# Patient Record
Sex: MALE | Race: WHITE | NOT HISPANIC OR LATINO | Employment: FULL TIME | URBAN - METROPOLITAN AREA
[De-identification: names, ages, dates, MRNs, and addresses within clinical notes are randomized per-mention and may not be internally consistent; named-entity substitution may affect disease eponyms.]

---

## 2019-11-14 ENCOUNTER — TELEPHONE (OUTPATIENT)
Dept: OBGYN CLINIC | Facility: HOSPITAL | Age: 54
End: 2019-11-14

## 2019-11-14 NOTE — TELEPHONE ENCOUNTER
Patient will be calling back to provide claim information        Insurance:  [employer]  Billing Address:  Claim #:  DOI:  /contact name:  Phone:  Fax:  Employer

## 2019-11-14 NOTE — TELEPHONE ENCOUNTER
Patient has called back to provide [facility] information    Patient was not able to provide   Address or Fax Number    [facility] information that was provided:    DOI: 10/22/2019    [facility] [address]att # [id number]    Adjusters Name: Aly Ellia Number: [phone]    QSRCV [id number]

## 2019-11-15 ENCOUNTER — OFFICE VISIT (OUTPATIENT)
Dept: OBGYN CLINIC | Facility: CLINIC | Age: 54
End: 2019-11-15
Payer: OTHER MISCELLANEOUS

## 2019-11-15 VITALS
WEIGHT: 175 LBS | HEART RATE: 102 BPM | DIASTOLIC BLOOD PRESSURE: 76 MMHG | HEIGHT: 66 IN | BODY MASS INDEX: 28.12 KG/M2 | SYSTOLIC BLOOD PRESSURE: 133 MMHG

## 2019-11-15 DIAGNOSIS — M70.62 GREATER TROCHANTERIC BURSITIS OF LEFT HIP: ICD-10-CM

## 2019-11-15 DIAGNOSIS — M76.12 PSOAS TENDINITIS OF LEFT SIDE: Primary | ICD-10-CM

## 2019-11-15 DIAGNOSIS — M25.552 PAIN IN LEFT HIP: ICD-10-CM

## 2019-11-15 PROCEDURE — 99203 OFFICE O/P NEW LOW 30 MIN: CPT | Performed by: ORTHOPAEDIC SURGERY

## 2019-11-15 RX ORDER — AMLODIPINE BESYLATE 5 MG/1
5 TABLET ORAL DAILY
COMMUNITY
Start: 2019-11-07

## 2019-11-15 NOTE — PROGRESS NOTES
52 y o male presents for evaluation of work related injury to his left hip region  Injury date was 10/22/2019 after lifting 2 boxes weighing approximately 50 lb off align he developed pain in his anterior groin and some of the posterior lateral aspect of the left hip  Patient notes that he had left total hip arthroplasty done August 2016 by Dr Colt Chapa at [facility]  He was doing well postoperatively without any problems until this event at work  Patient denies any specific back pain or radiation of pain down his leg  Denies any gross numbness or tingling in his lower extremities  He has use ice and heat and Tylenol with some improvement  He notes that ambulation and being on his feet a lot seems to aggravate his symptoms  His symptoms are significantly decreased when he is relaxed or at rest   He currently has a pain rating of 5/10  He has not done any recent physical therapy  He has not seen any other providers for this  Review of Systems  Review of systems negative unless otherwise specified in HPI    Past Medical History  Past Medical History:   Diagnosis Date    Hypertension      Past Surgical History  Past Surgical History:   Procedure Laterality Date    HIP SURGERY       Current Medications  Current Outpatient Medications on File Prior to Visit   Medication Sig Dispense Refill    amLODIPine (NORVASC) 5 mg tablet Take 5 mg by mouth daily       No current facility-administered medications on file prior to visit  Recent Labs (HCT,HGB,PT,INR,ESR,CRP,GLU,HgA1C)  No results found for: HCT, HGB, WBC, PT, INR, ESR, CRP, GLUCOSE, HGBA1C    Physical exam  · General: Awake, Alert, Oriented  · Eyes: Pupils equal, round and reactive to light  · Heart: regular rate and rhythm  · Lungs: No audible wheezing  · Abdomen: soft  left Lower extremity  · Well-healed anterior incision without erythema or warmth  No gross effusion  Leg lengths grossly symmetric  No calf pain negative Homans sign    Thigh soft nontender  Seated position or range of motion external rotation 45° internal rotation 20° quad and hamstring strength 4+ and symmetric bilaterally  Deep tendon reflexes that the patella 1+/4 and symmetric bilaterally  Light touch sensation intact throughout the thigh in shin and symmetric bilaterally  There is some discomfort with palpation over the left greater trochanteric region  There is no pain with palpation throughout the midline spinous processes or the SI joints  Dorsalis pedis posterior tibial pulses 3+/4  Procedure  None    Imaging  Reviewed outside films which notes left hip arthroplasty with some heterotopic bone formation but no gross signs of loosening  1  Psoas tendinitis of left side    2  Greater trochanteric bursitis of left hip    3  Pain in left hip      Assessment:  left total hip arthroplasty with some greater troch bursitis and psoas tendinitis  Plan:  Case was reviewed with Dr Renetta Sofia  We will get the patient started in physical therapy  Patient is to continue light duty as current  Patient will need follow up with his operative surgeon at [facility] Dr Alin Alvarez for further definitive care and treatment  Patient was to have a follow up in summer this will need to be rescheduled to sooner with his [address] [place] [address]

## 2019-11-15 NOTE — PATIENT INSTRUCTIONS
Case was reviewed with Dr Elvie France  We will get the patient started in physical therapy  Patient is to continue light duty as current  Patient will need follow up with his operative surgeon at [facility] Dr Isidro Angeles for further definitive care and treatment  Patient was to have a follow up in summer this will need to be rescheduled to sooner with his [address] [place] [address] Road

## 2019-11-15 NOTE — LETTER
November 15, 2019     Patient: David Monroe   YOB: 1965   Date of Visit: 11/15/2019       To Whom It May Concern: It is my medical opinion that David Monroe may return to light duty immediately with the following restrictions: Continues current light duty position with restriction until seen by his operative surgeon at [facility]  If you have any questions or concerns, please don't hesitate to call           Sincerely,        Genevieve Sepulveda PA-C  CC: No Recipients

## 2021-03-04 DIAGNOSIS — R06.00 DYSPNEA, UNSPECIFIED: ICD-10-CM

## 2021-03-04 DIAGNOSIS — Q25.1 COARCTATION OF AORTA: ICD-10-CM

## 2021-03-04 DIAGNOSIS — I47.1 SUPRAVENTRICULAR TACHYCARDIA (HCC): ICD-10-CM

## 2021-03-04 DIAGNOSIS — I49.3 VENTRICULAR PREMATURE DEPOLARIZATION: ICD-10-CM

## 2021-03-04 DIAGNOSIS — Z01.810 ENCOUNTER FOR PREPROCEDURAL CARDIOVASCULAR EXAMINATION: ICD-10-CM

## 2021-03-04 DIAGNOSIS — I44.4 LEFT ANTERIOR FASCICULAR BLOCK: ICD-10-CM

## 2021-03-04 DIAGNOSIS — I49.1 ATRIAL PREMATURE DEPOLARIZATION: ICD-10-CM

## 2021-03-10 ENCOUNTER — HOSPITAL ENCOUNTER (OUTPATIENT)
Dept: CT IMAGING | Facility: HOSPITAL | Age: 56
Discharge: HOME | End: 2021-03-10
Attending: INTERNAL MEDICINE
Payer: COMMERCIAL

## 2021-03-10 DIAGNOSIS — Q25.1 COARCTATION OF AORTA: ICD-10-CM

## 2021-03-10 DIAGNOSIS — I47.1 SUPRAVENTRICULAR TACHYCARDIA (HCC): ICD-10-CM

## 2021-03-10 DIAGNOSIS — I49.3 VENTRICULAR PREMATURE DEPOLARIZATION: ICD-10-CM

## 2021-03-10 DIAGNOSIS — I49.1 ATRIAL PREMATURE DEPOLARIZATION: ICD-10-CM

## 2021-03-10 DIAGNOSIS — Z01.810 ENCOUNTER FOR PREPROCEDURAL CARDIOVASCULAR EXAMINATION: ICD-10-CM

## 2021-03-10 DIAGNOSIS — I44.4 LEFT ANTERIOR FASCICULAR BLOCK: ICD-10-CM

## 2021-03-10 DIAGNOSIS — R06.00 DYSPNEA, UNSPECIFIED: ICD-10-CM

## 2021-03-10 PROCEDURE — 71275 CT ANGIOGRAPHY CHEST: CPT

## 2021-03-10 RX ADMIN — IOHEXOL 85 ML: 350 INJECTION, SOLUTION INTRAVENOUS at 07:59

## 2021-03-23 ENCOUNTER — HOSPITAL ENCOUNTER (OUTPATIENT)
Dept: NON INVASIVE DIAGNOSTICS | Facility: HOSPITAL | Age: 56
Discharge: HOME | End: 2021-03-23
Attending: INTERNAL MEDICINE
Payer: COMMERCIAL

## 2021-03-23 ENCOUNTER — HOSPITAL ENCOUNTER (OUTPATIENT)
Dept: NUCLEAR MEDICINE | Facility: HOSPITAL | Age: 56
Discharge: HOME | End: 2021-03-23
Attending: INTERNAL MEDICINE
Payer: COMMERCIAL

## 2021-03-23 DIAGNOSIS — R06.00 DYSPNEA, UNSPECIFIED TYPE: ICD-10-CM

## 2021-03-23 DIAGNOSIS — I49.1 ATRIAL PREMATURE DEPOLARIZATION: ICD-10-CM

## 2021-03-23 DIAGNOSIS — I44.4 LEFT ANTERIOR FASCICULAR BLOCK: ICD-10-CM

## 2021-03-23 DIAGNOSIS — R06.00 DYSPNEA, UNSPECIFIED: ICD-10-CM

## 2021-03-23 DIAGNOSIS — Z01.810 ENCOUNTER FOR PREPROCEDURAL CARDIOVASCULAR EXAMINATION: ICD-10-CM

## 2021-03-23 DIAGNOSIS — I49.3 VENTRICULAR PREMATURE DEPOLARIZATION: ICD-10-CM

## 2021-03-23 DIAGNOSIS — I47.1 SUPRAVENTRICULAR TACHYCARDIA (HCC): ICD-10-CM

## 2021-03-23 DIAGNOSIS — Q25.1 COARCTATION OF AORTA: ICD-10-CM

## 2021-03-23 PROCEDURE — A9502 TC99M TETROFOSMIN: HCPCS

## 2021-03-23 PROCEDURE — 78452 HT MUSCLE IMAGE SPECT MULT: CPT

## 2021-03-23 PROCEDURE — 93017 CV STRESS TEST TRACING ONLY: CPT

## 2021-03-23 PROCEDURE — 93306 TTE W/DOPPLER COMPLETE: CPT

## 2021-03-23 RX ORDER — LISINOPRIL 5 MG/1
TABLET ORAL
COMMUNITY
Start: 2021-03-08

## 2021-03-23 RX ORDER — TAMSULOSIN HYDROCHLORIDE 0.4 MG/1
CAPSULE ORAL
COMMUNITY
Start: 2021-02-10

## 2021-03-23 RX ADMIN — PERFLUTREN 0.4 ML/MIN: 6.52 INJECTION, SUSPENSION INTRAVENOUS at 09:57

## 2021-03-23 RX ADMIN — REGADENOSON 0.4 MG: 0.08 INJECTION, SOLUTION INTRAVENOUS at 11:25

## 2021-03-29 LAB
ARRHY DURING EX: NORMAL
CHEST PAIN STATEMENT: NORMAL
MAX DIASTOLIC BP: 66 MMHG
MAX HEART RATE: 123 BPM
MAX PREDICTED HEART RATE: 163 BPM
MAX. SYSTOLIC BP: 122 MMHG
PROTOCOL NAME: NORMAL
REASON FOR TERMINATION: NORMAL
TARGET HR FORMULA: NORMAL
TEST INDICATION: NORMAL
TIME IN EXERCISE PHASE: NORMAL

## 2021-12-16 ENCOUNTER — OFFICE VISIT (OUTPATIENT)
Dept: OBGYN CLINIC | Facility: CLINIC | Age: 56
End: 2021-12-16
Payer: COMMERCIAL

## 2021-12-16 VITALS
DIASTOLIC BLOOD PRESSURE: 80 MMHG | WEIGHT: 161 LBS | HEART RATE: 82 BPM | HEIGHT: 66 IN | BODY MASS INDEX: 25.88 KG/M2 | SYSTOLIC BLOOD PRESSURE: 136 MMHG | TEMPERATURE: 97.2 F

## 2021-12-16 DIAGNOSIS — M17.11 PRIMARY OSTEOARTHRITIS OF RIGHT KNEE: ICD-10-CM

## 2021-12-16 DIAGNOSIS — M25.561 CHRONIC PAIN OF RIGHT KNEE: Primary | ICD-10-CM

## 2021-12-16 DIAGNOSIS — G89.29 CHRONIC PAIN OF RIGHT KNEE: Primary | ICD-10-CM

## 2021-12-16 PROCEDURE — 99203 OFFICE O/P NEW LOW 30 MIN: CPT | Performed by: STUDENT IN AN ORGANIZED HEALTH CARE EDUCATION/TRAINING PROGRAM

## 2021-12-30 ENCOUNTER — TELEPHONE (OUTPATIENT)
Dept: OBGYN CLINIC | Facility: HOSPITAL | Age: 56
End: 2021-12-30

## 2022-01-10 ENCOUNTER — TELEPHONE (OUTPATIENT)
Dept: OBGYN CLINIC | Facility: HOSPITAL | Age: 57
End: 2022-01-10

## 2022-01-10 NOTE — TELEPHONE ENCOUNTER
When the patient calls back - please advise his 1st Visco appt was scheduled for today in error  I changed it accordingly  He now has his 1st on 1/17  2nd and 3rd each week afterwards  Please review his schedule with him

## 2022-01-14 ENCOUNTER — TELEPHONE (OUTPATIENT)
Dept: OBGYN CLINIC | Facility: CLINIC | Age: 57
End: 2022-01-14

## 2022-02-03 ENCOUNTER — PROCEDURE VISIT (OUTPATIENT)
Dept: OBGYN CLINIC | Facility: CLINIC | Age: 57
End: 2022-02-03
Payer: COMMERCIAL

## 2022-02-03 VITALS
DIASTOLIC BLOOD PRESSURE: 58 MMHG | HEIGHT: 66 IN | WEIGHT: 161 LBS | OXYGEN SATURATION: 98 % | HEART RATE: 84 BPM | SYSTOLIC BLOOD PRESSURE: 100 MMHG | BODY MASS INDEX: 25.88 KG/M2 | TEMPERATURE: 97.8 F

## 2022-02-03 DIAGNOSIS — M25.561 CHRONIC PAIN OF RIGHT KNEE: Primary | ICD-10-CM

## 2022-02-03 DIAGNOSIS — M17.11 PRIMARY OSTEOARTHRITIS OF RIGHT KNEE: ICD-10-CM

## 2022-02-03 DIAGNOSIS — G89.29 CHRONIC PAIN OF RIGHT KNEE: Primary | ICD-10-CM

## 2022-02-03 PROCEDURE — 20610 DRAIN/INJ JOINT/BURSA W/O US: CPT | Performed by: STUDENT IN AN ORGANIZED HEALTH CARE EDUCATION/TRAINING PROGRAM

## 2022-02-03 RX ORDER — HYALURONATE SODIUM 10 MG/ML
20 SYRINGE (ML) INTRAARTICULAR
Status: COMPLETED | OUTPATIENT
Start: 2022-02-03 | End: 2022-02-03

## 2022-02-03 RX ORDER — CHLORTHALIDONE 25 MG/1
TABLET ORAL
COMMUNITY
Start: 2021-12-19

## 2022-02-03 RX ORDER — TADALAFIL 20 MG/1
20 TABLET ORAL
COMMUNITY
Start: 2022-01-06 | End: 2023-01-06

## 2022-02-03 RX ADMIN — Medication 20 MG: at 09:42

## 2022-02-03 NOTE — PROGRESS NOTES
Large joint arthrocentesis: R knee  Universal Protocol:  Consent: Verbal consent obtained  Risks and benefits: risks, benefits and alternatives were discussed  Consent given by: patient  Time out: Immediately prior to procedure a "time out" was called to verify the correct patient, procedure, equipment, support staff and site/side marked as required  Timeout called at: 2/3/2022 9:36 AM   Patient understanding: patient states understanding of the procedure being performed  Site marked: the operative site was marked  Radiology Images displayed and confirmed   If images not available, report reviewed: imaging studies available  Patient identity confirmed: verbally with patient    Supporting Documentation  Indications: pain   Procedure Details  Location: knee - R knee  Preparation: Patient was prepped and draped in the usual sterile fashion  Needle size: 22 G  Ultrasound guidance: no  Approach: anterolateral  Medications administered: 20 mg Sodium Hyaluronate 20 MG/2ML    Patient tolerance: patient tolerated the procedure well with no immediate complications  Dressing:  Sterile dressing applied    Euflexxa #1/3 - follow up 1 week for #2/3

## 2022-02-10 ENCOUNTER — PROCEDURE VISIT (OUTPATIENT)
Dept: OBGYN CLINIC | Facility: CLINIC | Age: 57
End: 2022-02-10
Payer: COMMERCIAL

## 2022-02-10 VITALS
TEMPERATURE: 95.7 F | HEART RATE: 94 BPM | WEIGHT: 161 LBS | DIASTOLIC BLOOD PRESSURE: 68 MMHG | HEIGHT: 66 IN | SYSTOLIC BLOOD PRESSURE: 108 MMHG | BODY MASS INDEX: 25.88 KG/M2

## 2022-02-10 DIAGNOSIS — G89.29 CHRONIC PAIN OF RIGHT KNEE: Primary | ICD-10-CM

## 2022-02-10 DIAGNOSIS — M25.561 CHRONIC PAIN OF RIGHT KNEE: Primary | ICD-10-CM

## 2022-02-10 DIAGNOSIS — M17.11 PRIMARY OSTEOARTHRITIS OF RIGHT KNEE: ICD-10-CM

## 2022-02-10 PROCEDURE — 20610 DRAIN/INJ JOINT/BURSA W/O US: CPT | Performed by: STUDENT IN AN ORGANIZED HEALTH CARE EDUCATION/TRAINING PROGRAM

## 2022-02-10 RX ORDER — HYALURONATE SODIUM 10 MG/ML
20 SYRINGE (ML) INTRAARTICULAR
Status: COMPLETED | OUTPATIENT
Start: 2022-02-10 | End: 2022-02-10

## 2022-02-10 RX ADMIN — Medication 20 MG: at 09:48

## 2022-02-10 NOTE — PATIENT INSTRUCTIONS
Hyaluronic Acid (By injection)   Hyaluronic Acid (acr-dl-xll-ON-ate AS-id)  Treats severe pain in your knee due to osteoarthritis  Brand Name(s): Durolane, Euflexxa, Gel-One, GelSyn-3, GenVisc 850, Hyalgan, Hymovis, Monovisc, Orthovisc, Supartz FX, TriLURON, TriVisc, Visco-3   There may be other brand names for this medicine  When This Medicine Should Not Be Used: This medicine is not right for everyone  You should not receive it if you had an allergic reaction to hyaluronic acid or if you have a bleeding disorder  How to Use This Medicine:   Injectable  · Your doctor will tell you how many injections you will need  This medicine is injected into your knee joint  · A nurse or other health provider will give you this medicine  Drugs and Foods to Avoid:      Ask your doctor or pharmacist before using any other medicine, including over-the-counter medicines, vitamins, and herbal products  Warnings While Using This Medicine:   · Tell your doctor if you are pregnant or breastfeeding, or if you have any allergies, including to birds, feathers, or eggs  · Rest your knee for 48 hours after you receive an injection  Do not do strenuous, weightbearing activities, such as jogging or tennis  Avoid activities that keep you standing for longer than 1 hour  Possible Side Effects While Using This Medicine:   Call your doctor right away if you notice any of these side effects:  · Allergic reaction: Itching or hives, swelling in your face or hands, swelling or tingling in your mouth or throat, chest tightness, trouble breathing  If you notice these less serious side effects, talk with your doctor:   · Mild increase in pain or swelling in your knee  · Pain, redness, or swelling where the medicine is injected  If you notice other side effects that you think are caused by this medicine, tell your doctor  Call your doctor for medical advice about side effects   You may report side effects to FDA at 0-341-FDA-9458    © 1416 [facility] 2021 Information is for End User's use only and may not be sold, redistributed or otherwise used for commercial purposes  The above information is an  only  It is not intended as medical advice for individual conditions or treatments  Talk to your doctor, nurse or pharmacist before following any medical regimen to see if it is safe and effective for you

## 2022-02-10 NOTE — PROGRESS NOTES
Large joint arthrocentesis: R knee  Universal Protocol:  Consent: Verbal consent obtained  Risks and benefits: risks, benefits and alternatives were discussed  Consent given by: patient  Time out: Immediately prior to procedure a "time out" was called to verify the correct patient, procedure, equipment, support staff and site/side marked as required  Timeout called at: 2/10/2022 9:46 AM   Patient understanding: patient states understanding of the procedure being performed  Site marked: the operative site was marked  Radiology Images displayed and confirmed   If images not available, report reviewed: imaging studies available  Patient identity confirmed: verbally with patient    Supporting Documentation  Indications: pain   Procedure Details  Location: knee - R knee  Preparation: Patient was prepped and draped in the usual sterile fashion  Needle size: 22 G  Ultrasound guidance: no  Approach: anterolateral  Medications administered: 20 mg Sodium Hyaluronate 20 MG/2ML    Patient tolerance: patient tolerated the procedure well with no immediate complications  Dressing:  Sterile dressing applied

## 2022-02-17 ENCOUNTER — PROCEDURE VISIT (OUTPATIENT)
Dept: OBGYN CLINIC | Facility: CLINIC | Age: 57
End: 2022-02-17
Payer: COMMERCIAL

## 2022-02-17 ENCOUNTER — TELEPHONE (OUTPATIENT)
Dept: OBGYN CLINIC | Facility: CLINIC | Age: 57
End: 2022-02-17

## 2022-02-17 VITALS
HEIGHT: 66 IN | TEMPERATURE: 98.4 F | DIASTOLIC BLOOD PRESSURE: 70 MMHG | WEIGHT: 161 LBS | OXYGEN SATURATION: 97 % | HEART RATE: 79 BPM | BODY MASS INDEX: 25.88 KG/M2 | SYSTOLIC BLOOD PRESSURE: 110 MMHG

## 2022-02-17 DIAGNOSIS — M25.561 CHRONIC PAIN OF RIGHT KNEE: ICD-10-CM

## 2022-02-17 DIAGNOSIS — M17.11 PRIMARY OSTEOARTHRITIS OF RIGHT KNEE: ICD-10-CM

## 2022-02-17 DIAGNOSIS — G89.29 CHRONIC PAIN OF RIGHT KNEE: ICD-10-CM

## 2022-02-17 PROCEDURE — 20610 DRAIN/INJ JOINT/BURSA W/O US: CPT | Performed by: STUDENT IN AN ORGANIZED HEALTH CARE EDUCATION/TRAINING PROGRAM

## 2022-02-17 RX ORDER — HYALURONATE SODIUM 10 MG/ML
20 SYRINGE (ML) INTRAARTICULAR
Status: COMPLETED | OUTPATIENT
Start: 2022-02-17 | End: 2022-02-17

## 2022-02-17 RX ADMIN — Medication 20 MG: at 11:36

## 2022-02-17 NOTE — TELEPHONE ENCOUNTER
Attempted to reach pharmacy regarding [facility] Cornea not being able to  his previous order of Voltaren, Left message to confirm they had received the current order, was unable to reach pharmacy staff at the moment

## 2022-02-17 NOTE — PROGRESS NOTES
Large joint arthrocentesis: R knee  Universal Protocol:  Consent: Verbal consent obtained  Risks and benefits: risks, benefits and alternatives were discussed  Consent given by: patient  Time out: Immediately prior to procedure a "time out" was called to verify the correct patient, procedure, equipment, support staff and site/side marked as required  Timeout called at: 2/17/2022 10:25 AM   Patient understanding: patient states understanding of the procedure being performed  Site marked: the operative site was marked  Radiology Images displayed and confirmed   If images not available, report reviewed: imaging studies available  Patient identity confirmed: verbally with patient    Supporting Documentation  Indications: pain   Procedure Details  Location: knee - R knee  Preparation: Patient was prepped and draped in the usual sterile fashion  Needle size: 22 G  Ultrasound guidance: no  Approach: anterolateral  Medications administered: 20 mg Sodium Hyaluronate 20 MG/2ML    Patient tolerance: patient tolerated the procedure well with no immediate complications  Dressing:  Sterile dressing applied    Euflexxa #3/3

## 2022-02-17 NOTE — PATIENT INSTRUCTIONS
Hyaluronic Acid (By injection)   Hyaluronic Acid (fis-kv-lwk-ON-ate AS-id)  Treats severe pain in your knee due to osteoarthritis  Brand Name(s): Durolane, Euflexxa, Gel-One, GelSyn-3, GenVisc 850, Hyalgan, Hymovis, Monovisc, Orthovisc, Supartz FX, TriLURON, TriVisc, Visco-3   There may be other brand names for this medicine  When This Medicine Should Not Be Used: This medicine is not right for everyone  You should not receive it if you had an allergic reaction to hyaluronic acid or if you have a bleeding disorder  How to Use This Medicine:   Injectable  · Your doctor will tell you how many injections you will need  This medicine is injected into your knee joint  · A nurse or other health provider will give you this medicine  Drugs and Foods to Avoid:      Ask your doctor or pharmacist before using any other medicine, including over-the-counter medicines, vitamins, and herbal products  Warnings While Using This Medicine:   · Tell your doctor if you are pregnant or breastfeeding, or if you have any allergies, including to birds, feathers, or eggs  · Rest your knee for 48 hours after you receive an injection  Do not do strenuous, weightbearing activities, such as jogging or tennis  Avoid activities that keep you standing for longer than 1 hour  Possible Side Effects While Using This Medicine:   Call your doctor right away if you notice any of these side effects:  · Allergic reaction: Itching or hives, swelling in your face or hands, swelling or tingling in your mouth or throat, chest tightness, trouble breathing  If you notice these less serious side effects, talk with your doctor:   · Mild increase in pain or swelling in your knee  · Pain, redness, or swelling where the medicine is injected  If you notice other side effects that you think are caused by this medicine, tell your doctor  Call your doctor for medical advice about side effects   You may report side effects to FDA at 8-934-FDA-4630    © 5489 [facility] 2021 Information is for End User's use only and may not be sold, redistributed or otherwise used for commercial purposes  The above information is an  only  It is not intended as medical advice for individual conditions or treatments  Talk to your doctor, nurse or pharmacist before following any medical regimen to see if it is safe and effective for you

## 2022-03-21 ENCOUNTER — OFFICE VISIT (OUTPATIENT)
Dept: OBGYN CLINIC | Facility: CLINIC | Age: 57
End: 2022-03-21
Payer: COMMERCIAL

## 2022-03-21 VITALS
BODY MASS INDEX: 25.88 KG/M2 | HEART RATE: 91 BPM | HEIGHT: 66 IN | TEMPERATURE: 96.1 F | WEIGHT: 161 LBS | DIASTOLIC BLOOD PRESSURE: 76 MMHG | SYSTOLIC BLOOD PRESSURE: 110 MMHG

## 2022-03-21 DIAGNOSIS — M17.11 PRIMARY OSTEOARTHRITIS OF RIGHT KNEE: ICD-10-CM

## 2022-03-21 DIAGNOSIS — G89.29 CHRONIC PAIN OF RIGHT KNEE: Primary | ICD-10-CM

## 2022-03-21 DIAGNOSIS — M25.561 CHRONIC PAIN OF RIGHT KNEE: Primary | ICD-10-CM

## 2022-03-21 PROCEDURE — 99212 OFFICE O/P EST SF 10 MIN: CPT | Performed by: STUDENT IN AN ORGANIZED HEALTH CARE EDUCATION/TRAINING PROGRAM

## 2022-03-21 NOTE — PROGRESS NOTES
1  Chronic pain of right knee     2  Primary osteoarthritis of right knee       No orders of the defined types were placed in this encounter  Imaging Studies (I personally reviewed images in PACS and report):     X-ray right knee 12/02/2020: vis [facility] (I am unable to personally review images today, but report is available): X-ray 3 views of the right knee read from orthopedic standpoint show severe osteoarthritis of the right knee in the medial compartment with varus deformity there is also moderate patellofemoral arthritis  IMPRESSION:   Chronic right knee pain secondary to primary osteoarthritis with reported severe degenerative changes - improved status post Euflexxa injections    Other factors:  Noted to have Euflexxa x3 injections in December, 2020 from [facility] - reportedly had 6 months of relief with this injection    PLAN:     Clinical exam and radiographic imaging reviewed with patient today, with impression as per above  I have discussed with the patient the pathophysiology of this diagnosis and reviewed how the examination correlates with this diagnosis   Reassured patient that he is appropriately improving since his viscosupplementation injection as he has about 6 months relief with these injections, I will follow-up with him in 5 months to re-evaluate his symptoms to determine if he would want a repeat injection   In the interim, I recommend he continue using his knee bracing given the severity is osteoarthritis and his gait dysfunction due to his degenerative changes   I did discuss with patient that given the severe his osteoarthritis, he could be indicated for knee replacement but he prefers to defer surgical options for as long as possible with viscosupplementation injections for now  Return in about 5 months (around 8/21/2022) for Plan to inject right knee with visco shots in 5 months           CHIEF COMPLAINT:  Chief Complaint   Patient presents with    Follow-up HPI:  Freeman Saunders is a 60 y o  male  who presents for     Visit 03/21/2022: Follow up right knee pain s/p visco injection:  Patient is proximal 1 month since Euflexxa injections of his right knee  He states he has noticed significant relief since these injections about 3 weeks after receiving his 3rd injection  He states he is not 100% pain free, but feels that the pain is mild/tolerable and he is able to accomplish ADLs and sleep without issue  He states when he does have pain is over the superior aspect of his patella as well as the medial joint line  He states he continues to use his knee brace which does provide him support while he is working  He reports intermittent clicking/popping his knee and swelling towards the end of the day  He is satisfied with his progress thus far and does not feel that he would want to pursue surgical intervention for this issue yet  Denies new injuries since last visit  Denies any numbness of his right lower extremity        Medical, Surgical, Family, and Social History    Past Medical History:   Diagnosis Date    Hypertension      Past Surgical History:   Procedure Laterality Date    HIP SURGERY       Social History   Social History     Substance and Sexual Activity   Alcohol Use Not Currently     Social History     Substance and Sexual Activity   Drug Use Not Currently     Social History     Tobacco Use   Smoking Status Former Smoker   Smokeless Tobacco Never Used     Family History   Problem Relation Age of Onset    No Known Problems Mother     No Known Problems Father      No Known Allergies       Physical Exam  /76   Pulse 91   Temp (!) 96 1 °F (35 6 °C) (Temporal)   Ht 5' 6" (1 676 m)   Wt 73 kg (161 lb)   BMI 25 99 kg/m²     Constitutional:  see vital signs  Gen: well-developed, normocephalic/atraumatic, well-groomed  Eyes: No inflammation or discharge of conjunctiva or lids; sclera clear   Pharynx: no inflammation, lesion, or mass of lips  Neck: supple, no masses, non-distended  MSK: no inflammation, lesion, mass, or clubbing of nails and digits except for other than mentioned below  SKIN: no visible rashes or skin lesions  Pulmonary/Chest: Effort normal  No respiratory distress         Ortho Exam  Right Knee Exam:  Erythema: no  Swelling: none  Increased Warmth: no  Tenderness: +mildly over MJL  ROM: 0-120  Patellar Grind: +  Varus laxity: negative  Valgus laxity: negative

## 2022-07-28 ENCOUNTER — OFFICE VISIT (OUTPATIENT)
Dept: OBGYN CLINIC | Facility: CLINIC | Age: 57
End: 2022-07-28
Payer: COMMERCIAL

## 2022-07-28 VITALS
DIASTOLIC BLOOD PRESSURE: 72 MMHG | HEIGHT: 66 IN | SYSTOLIC BLOOD PRESSURE: 120 MMHG | HEART RATE: 95 BPM | TEMPERATURE: 96.7 F | WEIGHT: 161 LBS | BODY MASS INDEX: 25.88 KG/M2

## 2022-07-28 DIAGNOSIS — M17.11 PRIMARY OSTEOARTHRITIS OF RIGHT KNEE: ICD-10-CM

## 2022-07-28 DIAGNOSIS — M25.561 ACUTE PAIN OF RIGHT KNEE: Primary | ICD-10-CM

## 2022-07-28 PROCEDURE — 99213 OFFICE O/P EST LOW 20 MIN: CPT | Performed by: STUDENT IN AN ORGANIZED HEALTH CARE EDUCATION/TRAINING PROGRAM

## 2022-07-28 NOTE — PROGRESS NOTES
1  Acute pain of right knee  Injection Procedure Prior Authorization   2  Primary osteoarthritis of right knee  Injection Procedure Prior Authorization     Orders Placed This Encounter   Procedures    Injection Procedure Prior Authorization        Imaging Studies (I personally reviewed images in PACS and report):     X-ray right knee 12/02/2020: vis [facility] (I am unable to personally review images today, but report is available): X-ray 3 views of the right knee read from orthopedic standpoint show severe osteoarthritis of the right knee in the medial compartment with varus deformity there is also moderate patellofemoral arthritis  IMPRESSION:   Acute right knee pain secondary to primary osteoarthritis with reported severe degenerative changes - improved s/p prior visco injections   Worsening in past 1-2 weeks without injury   Consistent with OA pain   Declined CSI today in favor of repeating visco inj    Other factors:  Prior 6 months of relief from visco injections of the right knee    PLAN:     Clinical exam and radiographic imaging reviewed with patient today, with impression as per above  I have discussed with the patient the pathophysiology of this diagnosis and reviewed how the examination correlates with this diagnosis   Treatment options were discussed and patient was agreeable to repeat a viscosupplementation injection of his right knee today  Order was placed and patient will follow-up after approval   I did offer a cortisone injection of his right knee in the interim waiting for the viscosupplementation injection, patient deferred this option  Patient will continue conservative treatments to icing, Tylenol, compression knee sleeve use  He    Return for Follow up after approval on visco injection for RIGHT knee  CHIEF COMPLAINT:  Follow up right knee pain    HPI:  Ricky Grimes is a 60 y o  male  who presents for     Visit 7/28/2022:   Follow-up right knee pain secondary to osteoarthritis:  Patient was previously given Euflexxa injections in the past with significant relief  He states over the past 1 week he has progressively been having worsening pain of his right knee similar to the past   He states pain is mostly over the medial aspect of his knee and describes as a sharp/aching pain that is worse with prolonged standing, walking and transitioning from sitting to standing  He states he has been using his compression knee sleeve and applying ice to his knee which does provide some relief  He is interested in a repeat Visco supplementation injection of his knee going forward  Denies any new injuries of his right knee since this started progressively worsening  Denies any knee redness  He denies any fevers/chills, nausea/vomiting, diarrhea   Denies N/T of RLE        Medical, Surgical, Family, and Social History    Past Medical History:   Diagnosis Date    Hypertension      Past Surgical History:   Procedure Laterality Date    HIP SURGERY       Social History   Social History     Substance and Sexual Activity   Alcohol Use Not Currently     Social History     Substance and Sexual Activity   Drug Use Not Currently     Social History     Tobacco Use   Smoking Status Former Smoker   Smokeless Tobacco Never Used     Family History   Problem Relation Age of Onset    No Known Problems Mother     No Known Problems Father      No Known Allergies       Physical Exam  /72   Pulse 95   Temp (!) 96 7 °F (35 9 °C) (Temporal)   Ht 5' 6" (1 676 m)   Wt 73 kg (161 lb)   BMI 25 99 kg/m²     Constitutional:  see vital signs  Gen: well-developed, normocephalic/atraumatic, well-groomed  Eyes: No inflammation or discharge of conjunctiva or lids; sclera clear   Pharynx: no inflammation, lesion, or mass of lips  Neck: supple, no masses, non-distended  MSK: no inflammation, lesion, mass, or clubbing of nails and digits except for other than mentioned below  SKIN: no visible rashes or skin lesions  Pulmonary/Chest: Effort normal  No respiratory distress         Ortho Exam  Right Knee Exam:  Erythema: no  Swelling: none  Increased Warmth: no  Tenderness: +MJL  ROM: 0-120  Patellar Grind: +  Varus laxity: negative  Valgus laxity: negative  Gait: slight antalgia

## 2022-08-22 ENCOUNTER — PROCEDURE VISIT (OUTPATIENT)
Dept: OBGYN CLINIC | Facility: CLINIC | Age: 57
End: 2022-08-22
Payer: COMMERCIAL

## 2022-08-22 VITALS
DIASTOLIC BLOOD PRESSURE: 66 MMHG | OXYGEN SATURATION: 98 % | HEART RATE: 93 BPM | SYSTOLIC BLOOD PRESSURE: 118 MMHG | TEMPERATURE: 97.5 F | WEIGHT: 171 LBS | BODY MASS INDEX: 27.48 KG/M2 | HEIGHT: 66 IN

## 2022-08-22 DIAGNOSIS — M25.561 CHRONIC PAIN OF RIGHT KNEE: ICD-10-CM

## 2022-08-22 DIAGNOSIS — M17.11 PRIMARY OSTEOARTHRITIS OF RIGHT KNEE: ICD-10-CM

## 2022-08-22 DIAGNOSIS — G89.29 CHRONIC PAIN OF RIGHT KNEE: ICD-10-CM

## 2022-08-22 DIAGNOSIS — M25.561 ACUTE PAIN OF RIGHT KNEE: Primary | ICD-10-CM

## 2022-08-22 PROCEDURE — 20610 DRAIN/INJ JOINT/BURSA W/O US: CPT | Performed by: STUDENT IN AN ORGANIZED HEALTH CARE EDUCATION/TRAINING PROGRAM

## 2022-08-22 NOTE — PATIENT INSTRUCTIONS
Hyaluronic Acid (By injection)   Hyaluronic Acid (yru-ns-pei-ON-ate AS-id)  Treats severe pain in your knee due to osteoarthritis  Brand Name(s): Durolane, Euflexxa, Gel-One, GelSyn-3, GenVisc 850, Hyalgan, Hymovis, Monovisc, Orthovisc, Supartz FX, TriLURON, TriVisc, Visco-3   There may be other brand names for this medicine  When This Medicine Should Not Be Used: This medicine is not right for everyone  You should not receive it if you had an allergic reaction to hyaluronic acid or if you have a bleeding disorder  How to Use This Medicine:   Injectable  Your doctor will tell you how many injections you will need  This medicine is injected into your knee joint  A nurse or other health provider will give you this medicine  Drugs and Foods to Avoid:      Ask your doctor or pharmacist before using any other medicine, including over-the-counter medicines, vitamins, and herbal products  Warnings While Using This Medicine:   Tell your doctor if you are pregnant or breastfeeding, or if you have any allergies, including to birds, feathers, or eggs  Rest your knee for 48 hours after you receive an injection  Do not do strenuous, weightbearing activities, such as jogging or tennis  Avoid activities that keep you standing for longer than 1 hour  Possible Side Effects While Using This Medicine:   Call your doctor right away if you notice any of these side effects: Allergic reaction: Itching or hives, swelling in your face or hands, swelling or tingling in your mouth or throat, chest tightness, trouble breathing  If you notice these less serious side effects, talk with your doctor:   Mild increase in pain or swelling in your knee  Pain, redness, or swelling where the medicine is injected  If you notice other side effects that you think are caused by this medicine, tell your doctor  Call your doctor for medical advice about side effects   You may report side effects to FDA at 2-781-FDA-4819    © Copyright [employer] 2022 Information is for [employer] use only and may not be sold, redistributed or otherwise used for commercial purposes  The above information is an  only  It is not intended as medical advice for individual conditions or treatments  Talk to your doctor, nurse or pharmacist before following any medical regimen to see if it is safe and effective for you

## 2022-08-22 NOTE — PROGRESS NOTES
Large joint arthrocentesis: R knee  Universal Protocol:  Consent: Verbal consent obtained  Risks and benefits: risks, benefits and alternatives were discussed  Consent given by: patient  Patient understanding: patient states understanding of the procedure being performed  Site marked: the operative site was marked  Radiology Images displayed and confirmed   If images not available, report reviewed: imaging studies available  Patient identity confirmed: verbally with patient    Supporting Documentation  Indications: pain   Procedure Details  Location: knee - R knee  Preparation: Patient was prepped and draped in the usual sterile fashion  Needle size: 20 G  Ultrasound guidance: no  Approach: anterolateral  Medications administered: 88 mg hyaluronan 88 MG/4ML    Patient tolerance: patient tolerated the procedure well with no immediate complications  Dressing:  Sterile dressing applied

## 2022-09-26 ENCOUNTER — HOSPITAL ENCOUNTER (OUTPATIENT)
Dept: CT IMAGING | Facility: HOSPITAL | Age: 57
End: 2022-09-26
Payer: COMMERCIAL

## 2022-09-26 ENCOUNTER — LAB REQUISITION (OUTPATIENT)
Dept: LAB | Facility: HOSPITAL | Age: 57
End: 2022-09-26
Payer: COMMERCIAL

## 2022-09-26 ENCOUNTER — HOSPITAL ENCOUNTER (OUTPATIENT)
Dept: CT IMAGING | Facility: HOSPITAL | Age: 57
Discharge: HOME | End: 2022-09-26
Payer: COMMERCIAL

## 2022-09-26 DIAGNOSIS — R06.02 SHORTNESS OF BREATH: ICD-10-CM

## 2022-09-26 DIAGNOSIS — R00.0 TACHYCARDIA, UNSPECIFIED: ICD-10-CM

## 2022-09-26 DIAGNOSIS — D50.0 IRON DEFICIENCY ANEMIA SECONDARY TO BLOOD LOSS (CHRONIC): ICD-10-CM

## 2022-09-26 LAB
BASOPHILS # BLD AUTO: 0.03 THOUSANDS/ΜL (ref 0–0.1)
BASOPHILS NFR BLD AUTO: 0 % (ref 0–1)
EOSINOPHIL # BLD AUTO: 0.11 THOUSAND/ΜL (ref 0–0.61)
EOSINOPHIL NFR BLD AUTO: 1 % (ref 0–6)
ERYTHROCYTE [DISTWIDTH] IN BLOOD BY AUTOMATED COUNT: 16.9 % (ref 11.6–15.1)
HCT VFR BLD AUTO: 22.2 % (ref 36.5–49.3)
HGB BLD-MCNC: 7.1 G/DL (ref 12–17)
IMM GRANULOCYTES # BLD AUTO: 0.08 THOUSAND/UL (ref 0–0.2)
IMM GRANULOCYTES NFR BLD AUTO: 1 % (ref 0–2)
LYMPHOCYTES # BLD AUTO: 0.72 THOUSANDS/ΜL (ref 0.6–4.47)
LYMPHOCYTES NFR BLD AUTO: 8 % (ref 14–44)
MCH RBC QN AUTO: 30.2 PG (ref 26.8–34.3)
MCHC RBC AUTO-ENTMCNC: 32 G/DL (ref 31.4–37.4)
MCV RBC AUTO: 95 FL (ref 82–98)
MONOCYTES # BLD AUTO: 0.85 THOUSAND/ΜL (ref 0.17–1.22)
MONOCYTES NFR BLD AUTO: 10 % (ref 4–12)
NEUTROPHILS # BLD AUTO: 7 THOUSANDS/ΜL (ref 1.85–7.62)
NEUTS SEG NFR BLD AUTO: 80 % (ref 43–75)
NRBC BLD AUTO-RTO: 0 /100 WBCS
PLATELET # BLD AUTO: 195 THOUSANDS/UL (ref 149–390)
PMV BLD AUTO: 10.3 FL (ref 8.9–12.7)
RBC # BLD AUTO: 2.35 MILLION/UL (ref 3.88–5.62)
WBC # BLD AUTO: 8.79 THOUSAND/UL (ref 4.31–10.16)

## 2022-09-26 PROCEDURE — 83550 IRON BINDING TEST: CPT | Performed by: FAMILY MEDICINE

## 2022-09-26 PROCEDURE — 83540 ASSAY OF IRON: CPT | Performed by: FAMILY MEDICINE

## 2022-09-26 PROCEDURE — 85025 COMPLETE CBC W/AUTO DIFF WBC: CPT | Performed by: FAMILY MEDICINE

## 2022-09-26 PROCEDURE — 71275 CT ANGIOGRAPHY CHEST: CPT

## 2022-09-26 PROCEDURE — 82728 ASSAY OF FERRITIN: CPT | Performed by: FAMILY MEDICINE

## 2022-09-26 RX ADMIN — IOHEXOL 100 ML: 350 INJECTION, SOLUTION INTRAVENOUS at 18:53

## 2022-09-27 LAB
FERRITIN SERPL-MCNC: 591 NG/ML (ref 8–388)
IRON SATN MFR SERPL: 23 % (ref 20–50)
IRON SERPL-MCNC: 45 UG/DL (ref 65–175)
TIBC SERPL-MCNC: 197 UG/DL (ref 250–450)

## 2023-01-06 ENCOUNTER — OFFICE VISIT (OUTPATIENT)
Dept: OBGYN CLINIC | Facility: CLINIC | Age: 58
End: 2023-01-06

## 2023-01-06 VITALS
DIASTOLIC BLOOD PRESSURE: 70 MMHG | SYSTOLIC BLOOD PRESSURE: 120 MMHG | BODY MASS INDEX: 27.32 KG/M2 | WEIGHT: 170 LBS | HEIGHT: 66 IN | HEART RATE: 67 BPM | TEMPERATURE: 96.7 F

## 2023-01-06 DIAGNOSIS — M17.11 PRIMARY OSTEOARTHRITIS OF RIGHT KNEE: Primary | ICD-10-CM

## 2023-01-06 DIAGNOSIS — M25.561 CHRONIC PAIN OF RIGHT KNEE: ICD-10-CM

## 2023-01-06 DIAGNOSIS — G89.29 CHRONIC PAIN OF RIGHT KNEE: ICD-10-CM

## 2023-01-06 NOTE — PROGRESS NOTES
1  Primary osteoarthritis of right knee  Ambulatory Referral to Orthopedic Surgery      2  Chronic pain of right knee  Ambulatory Referral to Orthopedic Surgery        Orders Placed This Encounter   Procedures   • Ambulatory Referral to Orthopedic Surgery        Imaging Studies (I personally reviewed images in PACS and report):    • X-ray right knee 12/02/2020: vis [facility] (I am unable to personally review images today, but report is available): X-ray 3 views of the right knee read from orthopedic standpoint show severe osteoarthritis of the right knee in the medial compartment with varus deformity there is also moderate patellofemoral arthritis  IMPRESSION:  • Chronic right knee pain secondary to primary osteoarthritis with reported severe degenerative changes   • Reports only 4 months of relief from visco - acutely worsening in the past 1-2 weeks w/o new injury    PLAN:    • Clinical exam and radiographic imaging reviewed with patient today, with impression as per above  I have discussed with the patient the pathophysiology of this diagnosis and reviewed how the examination correlates with this diagnosis  • Given patient reports <6 months of relief from the visco injection, he is open to discussing knee replacement as an option - referred to Dr Yocasta Acuna (patient does see an orthopedic surgeon for his left hip, Dr Megan Kuhn; but patient prefers to discuss knee with a different orthopedic surgeon)  Return for Follow up with Dr Yocasta Acuna in regards to right knee  CHIEF COMPLAINT:  Follow up right knee pain    HPI:  Roxana Cornell is a 59 y o  male  who presents for     Visit 1/6/2022: Follow-up right knee pain secondary to severe osteoarthritis:  Patient was last seen on 8/22/2022 in which she received a viscosupplementation injection  He states he experienced about 4 months of relief before his pain slowly recurred again  He states it has been worsening over the past 1 or 2 weeks without a new injury  He again feels the pain is of the same intensity and mostly over the anterior medial aspect of his knee  He states it is worse with prolonged sitting, standing and walking  He states he is limited in which he can walk due to the pain  He also reports intermittent swelling of his knee  He is open to knee replacement at this time given he did not sustain an expected 6 months of relief from the Visco injection  He does see an orthopedic surgeon for his left hip but patient prefers to discuss with a different orthopedic surgeon in regards to replacing his right knee if it is warranted  Medical, Surgical, Family, and Social History    Past Medical History:   Diagnosis Date   • Hypertension      Past Surgical History:   Procedure Laterality Date   • HIP SURGERY       Social History   Social History     Substance and Sexual Activity   Alcohol Use Not Currently     Social History     Substance and Sexual Activity   Drug Use Not Currently     Social History     Tobacco Use   Smoking Status Former   Smokeless Tobacco Never     Family History   Problem Relation Age of Onset   • No Known Problems Mother    • No Known Problems Father      No Known Allergies       Physical Exam  /70   Pulse 67   Temp (!) 96 7 °F (35 9 °C) (Temporal)   Ht 5' 6" (1 676 m)   Wt 77 1 kg (170 lb)   BMI 27 44 kg/m²     Constitutional:  see vital signs  Gen: well-developed, normocephalic/atraumatic, well-groomed  Eyes: No inflammation or discharge of conjunctiva or lids; sclera clear   Pharynx: no inflammation, lesion, or mass of lips  Neck: supple, no masses, non-distended  MSK: no inflammation, lesion, mass, or clubbing of nails and digits except for other than mentioned below  SKIN: no visible rashes or skin lesions  Pulmonary/Chest: Effort normal  No respiratory distress         Ortho Exam  Right Knee Exam:  Erythema: no  Swelling: none  Increased Warmth: no  Tenderness: +MJL  ROM: 0-120  Patellar Grind: +  Varus laxity: negative  Valgus laxity: negative  Gait: slight antalgia

## 2023-01-09 ENCOUNTER — OFFICE VISIT (OUTPATIENT)
Dept: OBGYN CLINIC | Facility: CLINIC | Age: 58
End: 2023-01-09

## 2023-01-09 ENCOUNTER — PATIENT MESSAGE (OUTPATIENT)
Dept: OBGYN CLINIC | Facility: CLINIC | Age: 58
End: 2023-01-09

## 2023-01-09 ENCOUNTER — HOSPITAL ENCOUNTER (OUTPATIENT)
Dept: RADIOLOGY | Facility: CLINIC | Age: 58
Discharge: HOME | End: 2023-01-09

## 2023-01-09 VITALS
HEART RATE: 69 BPM | WEIGHT: 170 LBS | BODY MASS INDEX: 27.32 KG/M2 | TEMPERATURE: 97.3 F | HEIGHT: 66 IN | DIASTOLIC BLOOD PRESSURE: 72 MMHG | SYSTOLIC BLOOD PRESSURE: 115 MMHG

## 2023-01-09 DIAGNOSIS — G89.29 CHRONIC PAIN OF BOTH KNEES: ICD-10-CM

## 2023-01-09 DIAGNOSIS — M25.561 CHRONIC PAIN OF BOTH KNEES: ICD-10-CM

## 2023-01-09 DIAGNOSIS — M17.11 PRIMARY OSTEOARTHRITIS OF RIGHT KNEE: ICD-10-CM

## 2023-01-09 DIAGNOSIS — M25.562 CHRONIC PAIN OF BOTH KNEES: ICD-10-CM

## 2023-01-09 RX ORDER — SODIUM CHLORIDE, SODIUM LACTATE, POTASSIUM CHLORIDE, CALCIUM CHLORIDE 600; 310; 30; 20 MG/100ML; MG/100ML; MG/100ML; MG/100ML
125 INJECTION, SOLUTION INTRAVENOUS CONTINUOUS
OUTPATIENT
Start: 2023-01-09

## 2023-01-09 RX ORDER — MULTIVIT-MIN/IRON FUM/FOLIC AC 7.5 MG-4
1 TABLET ORAL DAILY
Qty: 60 TABLET | Refills: 0 | Status: SHIPPED | OUTPATIENT
Start: 2023-01-09

## 2023-01-09 RX ORDER — ASCORBIC ACID 500 MG
500 TABLET ORAL 2 TIMES DAILY
Qty: 120 TABLET | Refills: 0 | Status: SHIPPED | OUTPATIENT
Start: 2023-01-09

## 2023-01-09 RX ORDER — FERROUS SULFATE TAB EC 324 MG (65 MG FE EQUIVALENT) 324 (65 FE) MG
324 TABLET DELAYED RESPONSE ORAL
Qty: 120 TABLET | Refills: 0 | Status: SHIPPED | OUTPATIENT
Start: 2023-01-09

## 2023-01-09 RX ORDER — CHLORHEXIDINE GLUCONATE 0.12 MG/ML
15 RINSE ORAL ONCE
OUTPATIENT
Start: 2023-01-09 | End: 2023-01-09

## 2023-01-09 RX ORDER — FOLIC ACID 1 MG/1
1 TABLET ORAL DAILY
Qty: 60 TABLET | Refills: 0 | Status: SHIPPED | OUTPATIENT
Start: 2023-01-09

## 2023-01-09 NOTE — PROGRESS NOTES
ASSESSMENT/PLAN:    Diagnoses and all orders for this visit:    Chronic pain of both knees  -     Ambulatory Referral to Orthopedic Surgery  -     XR knee 4+ vw right injury; Future    Primary osteoarthritis of right knee  -     Ambulatory Referral to Orthopedic Surgery  -     XR knee 4+ vw right injury; Future  -     Comprehensive metabolic panel; Future  -     Hemoglobin A1C W/EAG Estimation; Future  -     CBC and differential; Future  -     Protime-INR; Future  -     APTT; Future  -     Type and screen; Future  -     Ambulatory referral to [facility]; Future  -     Ambulatory referral to Physical Therapy; Future  -     Case request operating room: ARTHROPLASTY KNEE TOTAL; Standing  -     EKG 12 lead; Future  -     ascorbic acid (VITAMIN C) 500 MG tablet; Take 1 tablet (500 mg total) by mouth 2 (two) times a day  -     ferrous sulfate 324 (65 Fe) mg; Take 1 tablet (324 mg total) by mouth 2 (two) times a day before meals  -     folic acid (FOLVITE) 1 mg tablet; Take 1 tablet (1 mg total) by mouth daily  -     Multiple Vitamins-Minerals (multivitamin with minerals) tablet; Take 1 tablet by mouth daily  -     Case request operating room: ARTHROPLASTY KNEE TOTAL    Other orders  -     Diet NPO; Sips with meds; Standing  -     Nursing Communication [address] [place] [address] Interventions Implemented; Standing  -     Nursing Communication [facility] bath, have staff wash entire body (neck down) per pre-op bathing protocol  Routine, evening prior to, and day of surgery ; Standing  -     Nursing Communication Swab both nares with Povidone-Iodine solution, EXCLUDE if patient has shellfish/Iodine allergy  Routine, day of surgery, on call to OR; Standing  -     chlorhexidine (PERIDEX) 0 12 % oral rinse 15 mL  -     Void on call to OR; Standing  -     Insert peripheral IV;  Standing  -     Place sequential compression device; Standing  -     lactated ringers infusion  -     tranexamic Acid 1,000 mg in sodium chloride 0 9 % 100 mL IVPB  - ceFAZolin (ANCEF) 2,000 mg in dextrose 5 % 100 mL IVPB        Plan: Patient was informed of the risks, benefits, options and alternatives of treatment, including but not limited to: Infection, blood loss, DVT, PE, prosthetic failure, wound healing problems, nerve or blood vessel injury, fracture, need for additional surgery, anesthetic risks, persistent symptoms and loss of motion  After a thorough discussion, the patient elects to proceed with knee replacement arthroplasty  Preoperative testing will be ordered as indicated as well as medical clearance, if needed  Postoperative DVT prophylaxis was discussed and he will be placed on aspirin postoperatively  The patient will be seen 2 weeks postoperatively for the initial follow-up in the office after surgery  Return for 2 weeks postop       _____________________________________________________  CHIEF COMPLAINT:  Chief Complaint   Patient presents with   • Right Knee - Pain         SUBJECTIVE:  Julio Cesar Irene is a 59y o  year old male who presents for evaluation of right knee pain  He admits to left knee pain, as well but states that the left knee is far less symptomatic than his right  He has had at least 2 to 3 years of right knee pain refractory to recent Visco supplement injection  He was seen by Dr Elsa Reyes, injection was performed about 4 months ago and he did receive improvement for nearly 3 to 4 months  Previous injections had helped for approximately 6 months  He complains of activity related pain but denies pain at rest   He notes start up pain  Although he does have pain with any activity, he is generally able to push through the pain and does not have to limit his activity  He denies ever having had corticosteroid injections  He has had no recent diagnostic studies, indicating his last x-rays were done 2 years ago at an [facility] facility  Denies paresthesias    He was seen by Dr Elsa Reyes on 1/6/2023 and referred for orthopedic surgical consultation and treatment due to the recurrent pain in a short period of time after viscosupplementation dejection  PAST MEDICAL HISTORY:  Past Medical History:   Diagnosis Date   • Hypertension        PAST SURGICAL HISTORY:  Past Surgical History:   Procedure Laterality Date   • HIP SURGERY         FAMILY HISTORY:  Family History   Problem Relation Age of Onset   • No Known Problems Mother    • No Known Problems Father        SOCIAL HISTORY:  Social History     Tobacco Use   • Smoking status: Former   • Smokeless tobacco: Never   Vaping Use   • Vaping Use: Never used   Substance Use Topics   • Alcohol use: Not Currently   • Drug use: Not Currently       MEDICATIONS:    Current Outpatient Medications:   •  amLODIPine (NORVASC) 5 mg tablet, Take 5 mg by mouth daily, Disp: , Rfl:   •  ascorbic acid (VITAMIN C) 500 MG tablet, Take 1 tablet (500 mg total) by mouth 2 (two) times a day, Disp: 120 tablet, Rfl: 0  •  chlorthalidone 25 mg tablet, , Disp: , Rfl:   •  Diclofenac Sodium (VOLTAREN) 1 %, Apply 2 g topically 4 (four) times a day, Disp: 100 g, Rfl: 6  •  ferrous sulfate 324 (65 Fe) mg, Take 1 tablet (324 mg total) by mouth 2 (two) times a day before meals, Disp: 120 tablet, Rfl: 0  •  folic acid (FOLVITE) 1 mg tablet, Take 1 tablet (1 mg total) by mouth daily, Disp: 60 tablet, Rfl: 0  •  lisinopril (ZESTRIL) 5 mg tablet, , Disp: , Rfl:   •  Multiple Vitamins-Minerals (multivitamin with minerals) tablet, Take 1 tablet by mouth daily, Disp: 60 tablet, Rfl: 0  •  tadalafil (CIALIS) 20 MG tablet, Take 20 mg by mouth, Disp: , Rfl:   •  tamsulosin (FLOMAX) 0 4 mg, , Disp: , Rfl:     ALLERGIES:  No Known Allergies    Review of systems:   Constitutional: Negative for fatigue, fever or loss of apetite  HENT: Negative  Respiratory: Negative for shortness of breath, dyspnea  Cardiovascular: Negative for chest pain/tightness  Gastrointestinal: Negative for abdominal pain, N/V     Endocrine: Negative for cold/heat intolerance, unexplained weight loss/gain  Genitourinary: Negative for flank pain, dysuria, hematuria  Musculoskeletal: Positive as in the HPI  Skin: Negative for rash  Neurological: Negative  Psychiatric/Behavioral: Negative for agitation  _____________________________________________________  PHYSICAL EXAMINATION:    Blood pressure 115/72, pulse 69, temperature (!) 97 3 °F (36 3 °C), temperature source Temporal, height 5' 6" (1 676 m), weight 77 1 kg (170 lb)  General: well developed and well nourished, alert, oriented times 3 and appears comfortable  Psychiatric: Normal  HEENT: Benign  Cardiovascular: Regular    Pulmonary: No wheezing or stridor  Abdomen: Soft, Nontender  Skin: No masses, erythema, lacerations, fluctation, ulcerations  Neurovascular: Motor and sensory exams are intact and pulses palpable  MUSCULOSKELETAL EXAMINATION:    The right knee exam demonstrates a minimal effusion  He does have some deformity  The skin is warm and dry  He has excellent range of motion from 5 degrees to 140 degrees without complaints of pain during range of motion  He does have tenderness along the medial femoral condyle, medial joint line and tibial plateau  The anterior and lateral knee are nontender  Ligaments are stable although the varus deformity can be corrected toward neutral   The remainder of the right lower extremity exam is benign  The left knee exam is benign  Left hip exam demonstrated somewhat limited range of motion status post hip replacement arthroplasty with heterotopic ossification  _____________________________________________________  STUDIES REVIEWED:  X-rays obtained today demonstrate advanced degenerative disease with complete loss of medial joint space, subchondral sclerosis and osteophytes  There is lesser changes noted laterally    There is moderate to significant patellofemoral disease of the patellofemoral joint with joint space narrowing, osteophytes and subchondral sclerosis        Ovidio Bolus

## 2023-01-09 NOTE — H&P (VIEW-ONLY)
ASSESSMENT/PLAN:    Diagnoses and all orders for this visit:    Chronic pain of both knees  -     Ambulatory Referral to Orthopedic Surgery  -     XR knee 4+ vw right injury; Future    Primary osteoarthritis of right knee  -     Ambulatory Referral to Orthopedic Surgery  -     XR knee 4+ vw right injury; Future  -     Comprehensive metabolic panel; Future  -     Hemoglobin A1C W/EAG Estimation; Future  -     CBC and differential; Future  -     Protime-INR; Future  -     APTT; Future  -     Type and screen; Future  -     Ambulatory referral to [facility]; Future  -     Ambulatory referral to Physical Therapy; Future  -     Case request operating room: ARTHROPLASTY KNEE TOTAL; Standing  -     EKG 12 lead; Future  -     ascorbic acid (VITAMIN C) 500 MG tablet; Take 1 tablet (500 mg total) by mouth 2 (two) times a day  -     ferrous sulfate 324 (65 Fe) mg; Take 1 tablet (324 mg total) by mouth 2 (two) times a day before meals  -     folic acid (FOLVITE) 1 mg tablet; Take 1 tablet (1 mg total) by mouth daily  -     Multiple Vitamins-Minerals (multivitamin with minerals) tablet; Take 1 tablet by mouth daily  -     Case request operating room: ARTHROPLASTY KNEE TOTAL    Other orders  -     Diet NPO; Sips with meds; Standing  -     Nursing Communication [address] [place] [address] Interventions Implemented; Standing  -     Nursing Communication [facility] bath, have staff wash entire body (neck down) per pre-op bathing protocol  Routine, evening prior to, and day of surgery ; Standing  -     Nursing Communication Swab both nares with Povidone-Iodine solution, EXCLUDE if patient has shellfish/Iodine allergy  Routine, day of surgery, on call to OR; Standing  -     chlorhexidine (PERIDEX) 0 12 % oral rinse 15 mL  -     Void on call to OR; Standing  -     Insert peripheral IV;  Standing  -     Place sequential compression device; Standing  -     lactated ringers infusion  -     tranexamic Acid 1,000 mg in sodium chloride 0 9 % 100 mL IVPB  - ceFAZolin (ANCEF) 2,000 mg in dextrose 5 % 100 mL IVPB        Plan: Patient was informed of the risks, benefits, options and alternatives of treatment, including but not limited to: Infection, blood loss, DVT, PE, prosthetic failure, wound healing problems, nerve or blood vessel injury, fracture, need for additional surgery, anesthetic risks, persistent symptoms and loss of motion  After a thorough discussion, the patient elects to proceed with knee replacement arthroplasty  Preoperative testing will be ordered as indicated as well as medical clearance, if needed  Postoperative DVT prophylaxis was discussed and he will be placed on aspirin postoperatively  The patient will be seen 2 weeks postoperatively for the initial follow-up in the office after surgery  Return for 2 weeks postop       _____________________________________________________  CHIEF COMPLAINT:  Chief Complaint   Patient presents with   • Right Knee - Pain         SUBJECTIVE:  Paco Garcia is a 59y o  year old male who presents for evaluation of right knee pain  He admits to left knee pain, as well but states that the left knee is far less symptomatic than his right  He has had at least 2 to 3 years of right knee pain refractory to recent Visco supplement injection  He was seen by Dr Ernestina Murray, injection was performed about 4 months ago and he did receive improvement for nearly 3 to 4 months  Previous injections had helped for approximately 6 months  He complains of activity related pain but denies pain at rest   He notes start up pain  Although he does have pain with any activity, he is generally able to push through the pain and does not have to limit his activity  He denies ever having had corticosteroid injections  He has had no recent diagnostic studies, indicating his last x-rays were done 2 years ago at an [facility] facility  Denies paresthesias    He was seen by Dr Ernestina Murray on 1/6/2023 and referred for orthopedic surgical consultation and treatment due to the recurrent pain in a short period of time after viscosupplementation dejection  PAST MEDICAL HISTORY:  Past Medical History:   Diagnosis Date   • Hypertension        PAST SURGICAL HISTORY:  Past Surgical History:   Procedure Laterality Date   • HIP SURGERY         FAMILY HISTORY:  Family History   Problem Relation Age of Onset   • No Known Problems Mother    • No Known Problems Father        SOCIAL HISTORY:  Social History     Tobacco Use   • Smoking status: Former   • Smokeless tobacco: Never   Vaping Use   • Vaping Use: Never used   Substance Use Topics   • Alcohol use: Not Currently   • Drug use: Not Currently       MEDICATIONS:    Current Outpatient Medications:   •  amLODIPine (NORVASC) 5 mg tablet, Take 5 mg by mouth daily, Disp: , Rfl:   •  ascorbic acid (VITAMIN C) 500 MG tablet, Take 1 tablet (500 mg total) by mouth 2 (two) times a day, Disp: 120 tablet, Rfl: 0  •  chlorthalidone 25 mg tablet, , Disp: , Rfl:   •  Diclofenac Sodium (VOLTAREN) 1 %, Apply 2 g topically 4 (four) times a day, Disp: 100 g, Rfl: 6  •  ferrous sulfate 324 (65 Fe) mg, Take 1 tablet (324 mg total) by mouth 2 (two) times a day before meals, Disp: 120 tablet, Rfl: 0  •  folic acid (FOLVITE) 1 mg tablet, Take 1 tablet (1 mg total) by mouth daily, Disp: 60 tablet, Rfl: 0  •  lisinopril (ZESTRIL) 5 mg tablet, , Disp: , Rfl:   •  Multiple Vitamins-Minerals (multivitamin with minerals) tablet, Take 1 tablet by mouth daily, Disp: 60 tablet, Rfl: 0  •  tadalafil (CIALIS) 20 MG tablet, Take 20 mg by mouth, Disp: , Rfl:   •  tamsulosin (FLOMAX) 0 4 mg, , Disp: , Rfl:     ALLERGIES:  No Known Allergies    Review of systems:   Constitutional: Negative for fatigue, fever or loss of apetite  HENT: Negative  Respiratory: Negative for shortness of breath, dyspnea  Cardiovascular: Negative for chest pain/tightness  Gastrointestinal: Negative for abdominal pain, N/V     Endocrine: Negative for cold/heat intolerance, unexplained weight loss/gain  Genitourinary: Negative for flank pain, dysuria, hematuria  Musculoskeletal: Positive as in the HPI  Skin: Negative for rash  Neurological: Negative  Psychiatric/Behavioral: Negative for agitation  _____________________________________________________  PHYSICAL EXAMINATION:    Blood pressure 115/72, pulse 69, temperature (!) 97 3 °F (36 3 °C), temperature source Temporal, height 5' 6" (1 676 m), weight 77 1 kg (170 lb)  General: well developed and well nourished, alert, oriented times 3 and appears comfortable  Psychiatric: Normal  HEENT: Benign  Cardiovascular: Regular    Pulmonary: No wheezing or stridor  Abdomen: Soft, Nontender  Skin: No masses, erythema, lacerations, fluctation, ulcerations  Neurovascular: Motor and sensory exams are intact and pulses palpable  MUSCULOSKELETAL EXAMINATION:    The right knee exam demonstrates a minimal effusion  He does have some deformity  The skin is warm and dry  He has excellent range of motion from 5 degrees to 140 degrees without complaints of pain during range of motion  He does have tenderness along the medial femoral condyle, medial joint line and tibial plateau  The anterior and lateral knee are nontender  Ligaments are stable although the varus deformity can be corrected toward neutral   The remainder of the right lower extremity exam is benign  The left knee exam is benign  Left hip exam demonstrated somewhat limited range of motion status post hip replacement arthroplasty with heterotopic ossification  _____________________________________________________  STUDIES REVIEWED:  X-rays obtained today demonstrate advanced degenerative disease with complete loss of medial joint space, subchondral sclerosis and osteophytes  There is lesser changes noted laterally    There is moderate to significant patellofemoral disease of the patellofemoral joint with joint space narrowing, osteophytes and subchondral ngoc Drake

## 2023-01-10 ENCOUNTER — TELEPHONE (OUTPATIENT)
Dept: PAIN MEDICINE | Facility: CLINIC | Age: 58
End: 2023-01-10

## 2023-01-11 ENCOUNTER — TELEPHONE (OUTPATIENT)
Dept: PAIN MEDICINE | Facility: CLINIC | Age: 58
End: 2023-01-11

## 2023-01-11 NOTE — TELEPHONE ENCOUNTER
Physical Therapy referral faxed to [facility] rehab per patient's request

## 2023-01-11 NOTE — TELEPHONE ENCOUNTER
Patient returned my call  We discussed upcoming sx  Patient advised to go to [address] [place] [address] for labs and EKG by Friday, 1/13/2023  Patient states he will contact Dr Romel White to schedule medical clearance appt  Faxing referral for PT to [facility] per patient's request  Reviewed pre-op vitamins and advised patient to begin taking 2 weeks prior to sx, approx 1/17/2023  Discussed the prescribed anticoagulant and the importance that patient should not begin taking until advised to take after his sx  Reviewed all contents of MLJ bag  Patient acknowledged understanding and will contact me with any questions

## 2023-01-12 ENCOUNTER — OFFICE VISIT (OUTPATIENT)
Dept: LAB | Facility: HOSPITAL | Age: 58
End: 2023-01-12

## 2023-01-12 ENCOUNTER — APPOINTMENT (OUTPATIENT)
Dept: LAB | Facility: HOSPITAL | Age: 58
End: 2023-01-12

## 2023-01-12 DIAGNOSIS — M17.11 PRIMARY OSTEOARTHRITIS OF RIGHT KNEE: ICD-10-CM

## 2023-01-12 DIAGNOSIS — M17.11 PRIMARY OSTEOARTHRITIS OF RIGHT KNEE: Primary | ICD-10-CM

## 2023-01-12 LAB
ABO GROUP BLD: NORMAL
ALBUMIN SERPL BCP-MCNC: 4.3 G/DL (ref 3.5–5)
ALP SERPL-CCNC: 131 U/L (ref 46–116)
ALT SERPL W P-5'-P-CCNC: 36 U/L (ref 12–78)
ANION GAP SERPL CALCULATED.3IONS-SCNC: 7 MMOL/L (ref 4–13)
APTT PPP: 25 SECONDS (ref 23–37)
AST SERPL W P-5'-P-CCNC: 29 U/L (ref 5–45)
BASOPHILS # BLD AUTO: 0.04 THOUSANDS/ÂΜL (ref 0–0.1)
BASOPHILS NFR BLD AUTO: 1 % (ref 0–1)
BILIRUB SERPL-MCNC: 0.63 MG/DL (ref 0.2–1)
BLD GP AB SCN SERPL QL: NEGATIVE
BUN SERPL-MCNC: 38 MG/DL (ref 5–25)
CALCIUM SERPL-MCNC: 9.6 MG/DL (ref 8.3–10.1)
CHLORIDE SERPL-SCNC: 98 MMOL/L (ref 96–108)
CO2 SERPL-SCNC: 29 MMOL/L (ref 21–32)
CREAT SERPL-MCNC: 1.16 MG/DL (ref 0.6–1.3)
EOSINOPHIL # BLD AUTO: 0.07 THOUSAND/ÂΜL (ref 0–0.61)
EOSINOPHIL NFR BLD AUTO: 1 % (ref 0–6)
ERYTHROCYTE [DISTWIDTH] IN BLOOD BY AUTOMATED COUNT: 13.2 % (ref 11.6–15.1)
EST. AVERAGE GLUCOSE BLD GHB EST-MCNC: 140 MG/DL
GFR SERPL CREATININE-BSD FRML MDRD: 68 ML/MIN/1.73SQ M
GLUCOSE P FAST SERPL-MCNC: 87 MG/DL (ref 65–99)
HBA1C MFR BLD: 6.5 %
HCT VFR BLD AUTO: 44.7 % (ref 36.5–49.3)
HGB BLD-MCNC: 15 G/DL (ref 12–17)
IMM GRANULOCYTES # BLD AUTO: 0.03 THOUSAND/UL (ref 0–0.2)
IMM GRANULOCYTES NFR BLD AUTO: 0 % (ref 0–2)
INR PPP: 0.93 (ref 0.84–1.19)
LYMPHOCYTES # BLD AUTO: 1.02 THOUSANDS/ÂΜL (ref 0.6–4.47)
LYMPHOCYTES NFR BLD AUTO: 14 % (ref 14–44)
MCH RBC QN AUTO: 29 PG (ref 26.8–34.3)
MCHC RBC AUTO-ENTMCNC: 33.6 G/DL (ref 31.4–37.4)
MCV RBC AUTO: 87 FL (ref 82–98)
MONOCYTES # BLD AUTO: 0.8 THOUSAND/ÂΜL (ref 0.17–1.22)
MONOCYTES NFR BLD AUTO: 11 % (ref 4–12)
NEUTROPHILS # BLD AUTO: 5.33 THOUSANDS/ÂΜL (ref 1.85–7.62)
NEUTS SEG NFR BLD AUTO: 73 % (ref 43–75)
NRBC BLD AUTO-RTO: 0 /100 WBCS
PLATELET # BLD AUTO: 225 THOUSANDS/UL (ref 149–390)
PMV BLD AUTO: 11 FL (ref 8.9–12.7)
POTASSIUM SERPL-SCNC: 4.1 MMOL/L (ref 3.5–5.3)
PROT SERPL-MCNC: 7.6 G/DL (ref 6.4–8.4)
PROTHROMBIN TIME: 12.6 SECONDS (ref 11.6–14.5)
RBC # BLD AUTO: 5.17 MILLION/UL (ref 3.88–5.62)
RH BLD: POSITIVE
SODIUM SERPL-SCNC: 134 MMOL/L (ref 135–147)
SPECIMEN EXPIRATION DATE: NORMAL
WBC # BLD AUTO: 7.29 THOUSAND/UL (ref 4.31–10.16)

## 2023-01-12 RX ORDER — BOSENTAN 125 MG/1
125 TABLET, FILM COATED ORAL 2 TIMES DAILY
COMMUNITY

## 2023-01-12 RX ORDER — LANOLIN ALCOHOL/MO/W.PET/CERES
1000 CREAM (GRAM) TOPICAL DAILY
COMMUNITY

## 2023-01-13 ENCOUNTER — TELEPHONE (OUTPATIENT)
Dept: OBGYN CLINIC | Facility: HOSPITAL | Age: 58
End: 2023-01-13

## 2023-01-13 DIAGNOSIS — M25.561 CHRONIC PAIN OF BOTH KNEES: Primary | ICD-10-CM

## 2023-01-13 DIAGNOSIS — G89.29 CHRONIC PAIN OF BOTH KNEES: Primary | ICD-10-CM

## 2023-01-13 DIAGNOSIS — M25.562 CHRONIC PAIN OF BOTH KNEES: Primary | ICD-10-CM

## 2023-01-13 DIAGNOSIS — M17.11 PRIMARY OSTEOARTHRITIS OF RIGHT KNEE: ICD-10-CM

## 2023-01-13 LAB
ATRIAL RATE: 68 BPM
P AXIS: 38 DEGREES
PR INTERVAL: 214 MS
QRS AXIS: -48 DEGREES
QRSD INTERVAL: 110 MS
QT INTERVAL: 388 MS
QTC INTERVAL: 412 MS
T WAVE AXIS: 36 DEGREES
VENTRICULAR RATE: 68 BPM

## 2023-01-16 NOTE — TELEPHONE ENCOUNTER
Preoperative Elective Admission Assessment- spoke to patient    [facility] ([facility]) OW    Living Situation:    Who does pt live with: friend  What kind of home: multi-level  How do they enter the home: front  How many levels in home: 2 levels    # of steps to enter home: 2 to enter   # of steps to second floor: 12 to 2nd floor   Are there handrails: Yes  Are there landings: Yes  Sleeping arrangement: first/entry floor  Where is Bathroom: First floor bathroom, 1/2 bath  Where is the tub or shower: Second floor full bathroom with step in tub    First Floor Setup:   Is there a bathroom: Yes  Where would pt sleep: bed     DME: frame walker and cane     Patient's Current Level of Function: Ambulates: Independently and ADLs: Independent    Post-op Caregiver: child  Caregiver Name and phone number for Inpatient discharge needs: SonGareth   Currently receive any HHC/aides/community supports: No     Post-op Transport: child  To/from hospital: child  To/from PT 2-3x/week: child  Uses community transport now: No     Outpatient Physical Therapy Site:  Site: [facility] 2/2 or 2/3 appt   pre and post-op appts scheduled? No     Medication Management: self  Preferred Pharmacy for Post-op Medications: [facility]   Blood Management Vitamin Regimen: Starting 1/17   Post-op anticoagulant: to be determined by surgical team postoperatively     DC Plan: Pt plans to be discharged home  Pt educated that our goal, if at all possible, is to appropriately discharge patient based off their post-op function while striving to maintain maximal independence  If possible, the goal is to discharge patient to home and for them to attend outpatient physical therapy      Barriers to DC identified preoperatively: none identified    BMI: 27 44     Enrolled in [employer]     Patient Education:  Pt educated on post-op pain, early mobilization (POD0), Average inpt LOS, OP PT goal   Patient educated that our goal is to appropriately discharge patient based off their post-op function while striving to maintain maximal independence  The goal is to discharge patient to home and for them to attend outpatient physical therapy

## 2023-01-17 RX ORDER — ACETAMINOPHEN 500 MG
1000 TABLET ORAL EVERY 6 HOURS PRN
Status: ON HOLD | COMMUNITY
End: 2023-02-01 | Stop reason: SDUPTHER

## 2023-01-17 NOTE — PRE-PROCEDURE INSTRUCTIONS
Pre-Surgery Instructions:   Medication Instructions   • acetaminophen (TYLENOL) 500 mg tablet Hold day of surgery  MD orders for DOS   • amLODIPine (NORVASC) 5 mg tablet Take day of surgery  • ascorbic acid (VITAMIN C) 500 MG tablet Hold day of surgery  • bosentan (TRACLEER) 125 MG tablet Instructions provided by MD   • chlorthalidone 25 mg tablet Hold the day of surgery   • Diclofenac Sodium (VOLTAREN) 1 % Stop taking 3 days prior to surgery  • ferrous sulfate 324 (65 Fe) mg Hold day of surgery  • folic acid (FOLVITE) 1 mg tablet Hold day of surgery  • lisinopril (ZESTRIL) 5 mg tablet Hold day of surgery  • metoprolol tartrate (LOPRESSOR) 25 mg tablet Take day of surgery  • Multiple Vitamins-Minerals (multivitamin with minerals) tablet Hold day of surgery  • vitamin B-12 (VITAMIN B-12) 1,000 mcg tablet Hold day of surgery     See above    Pt instructed to take am meds with a small sip of water the DOS

## 2023-01-30 ENCOUNTER — ANESTHESIA EVENT (OUTPATIENT)
Dept: PERIOP | Facility: HOSPITAL | Age: 58
End: 2023-01-30

## 2023-01-30 NOTE — ANESTHESIA PREPROCEDURE EVALUATION
Procedure:  ARTHROPLASTY KNEE TOTAL (Right: Knee)    Relevant Problems   CARDIO   (+) Hypertension      MUSCULOSKELETAL   (+) Primary osteoarthritis of right knee        Physical Exam    Airway    Mallampati score: II  TM Distance: >3 FB  Neck ROM: full     Dental   No notable dental hx     Cardiovascular  Cardiovascular exam normal    Pulmonary  Pulmonary exam normal     Other Findings    HTN    Former smoker      1/12/23 Sinus rhythm with 1st degree A-V block  Left anterior fascicular block  Moderate voltage criteria for LVH, may be normal variant ( R in aVL , [place] product )  Abnormal ECG  No previous ECGs available  Anesthesia Plan  ASA Score- 3     Anesthesia Type- spinal with ASA Monitors  Additional Monitors:   Airway Plan:     Comment: adductor canal block  I did not do IPAC because Dr Joslyn Epps injects pericapsular local        Plan Factors-Exercise tolerance (METS): >4 METS  Chart reviewed  EKG reviewed  Imaging results reviewed  Existing labs reviewed  Patient summary reviewed  Patient is not a current smoker  Patient not instructed to abstain from smoking on day of procedure  Patient did not smoke on day of surgery  Obstructive sleep apnea risk education given perioperatively  Induction-     Postoperative Plan-     Informed Consent- Anesthetic plan and risks discussed with patient  I personally reviewed this patient with the CRNA  Discussed and agreed on the Anesthesia Plan with the CRNA           3/23/21Rest ECG: On rest ECG there is NSR and LAHB  -  Stress results: There was no chest pain during stress  -  ECG conclusions: The stress ECG was negative for ischemia and normal   -  Perfusion imaging: There was a small to moderate, fixed myocardial perfusion defect of the anterolateral and inferoseptal wall likely  -  Gated SPECT: The calculated left ventricular ejection fraction was 54 %   There was no left ventricular regional abnormality   -  Impressions and recommendations: Probably normal regadenoson MPI  Fixed defect with normal wall motion- likely soft tissue or motion artifact  No evidence of ischemia  Normal gated LVEF

## 2023-01-31 ENCOUNTER — ANESTHESIA (OUTPATIENT)
Dept: PERIOP | Facility: HOSPITAL | Age: 58
End: 2023-01-31

## 2023-01-31 ENCOUNTER — HOSPITAL ENCOUNTER (OUTPATIENT)
Facility: HOSPITAL | Age: 58
Setting detail: OUTPATIENT SURGERY
Discharge: HOME | End: 2023-02-01
Attending: ORTHOPAEDIC SURGERY | Admitting: ORTHOPAEDIC SURGERY

## 2023-01-31 DIAGNOSIS — M17.11 PRIMARY OSTEOARTHRITIS OF RIGHT KNEE: ICD-10-CM

## 2023-01-31 DIAGNOSIS — I10 PRIMARY HYPERTENSION: Primary | ICD-10-CM

## 2023-01-31 PROBLEM — Z85.46 HISTORY OF PROSTATE CANCER: Status: ACTIVE | Noted: 2023-01-31

## 2023-01-31 LAB
GLUCOSE SERPL-MCNC: 91 MG/DL (ref 65–140)
GLUCOSE SERPL-MCNC: 92 MG/DL (ref 65–140)

## 2023-01-31 DEVICE — ATTUNE KNEE SYSTEM FEMORAL CRUCIATE RETAINING SIZE 6 RIGHT CEMENTED
Type: IMPLANTABLE DEVICE | Site: KNEE | Status: FUNCTIONAL
Brand: ATTUNE

## 2023-01-31 DEVICE — ATTUNE KNEE SYSTEM TIBIAL INSERT FIXED BEARING CRUCIATE RETAINING 6 5MM AOX
Type: IMPLANTABLE DEVICE | Site: KNEE | Status: FUNCTIONAL
Brand: ATTUNE

## 2023-01-31 DEVICE — SMARTSET HIGH PERFORMANCE MV MEDIUM VISCOSITY BONE CEMENT 40G
Type: IMPLANTABLE DEVICE | Site: KNEE | Status: FUNCTIONAL
Brand: SMARTSET

## 2023-01-31 DEVICE — ATTUNE KNEE SYSTEM TIBIAL BASE FIXED BEARING SIZE 5 CEMENTED
Type: IMPLANTABLE DEVICE | Site: KNEE | Status: FUNCTIONAL
Brand: ATTUNE

## 2023-01-31 DEVICE — ATTUNE PATELLA MEDIALIZED DOME 35MM CEMENTED AOX
Type: IMPLANTABLE DEVICE | Site: KNEE | Status: FUNCTIONAL
Brand: ATTUNE

## 2023-01-31 RX ORDER — FOLIC ACID 1 MG/1
1 TABLET ORAL DAILY
Status: DISCONTINUED | OUTPATIENT
Start: 2023-01-31 | End: 2023-02-01 | Stop reason: HOSPADM

## 2023-01-31 RX ORDER — CALCIUM CARBONATE 200(500)MG
1000 TABLET,CHEWABLE ORAL DAILY PRN
Status: DISCONTINUED | OUTPATIENT
Start: 2023-01-31 | End: 2023-02-01 | Stop reason: HOSPADM

## 2023-01-31 RX ORDER — CEFAZOLIN SODIUM 2 G/50ML
2000 SOLUTION INTRAVENOUS EVERY 8 HOURS
Status: COMPLETED | OUTPATIENT
Start: 2023-01-31 | End: 2023-02-01

## 2023-01-31 RX ORDER — TAMSULOSIN HYDROCHLORIDE 0.4 MG/1
0.4 CAPSULE ORAL
Status: DISCONTINUED | OUTPATIENT
Start: 2023-01-31 | End: 2023-02-01 | Stop reason: HOSPADM

## 2023-01-31 RX ORDER — CEFAZOLIN SODIUM 2 G/50ML
2000 SOLUTION INTRAVENOUS ONCE
Status: COMPLETED | OUTPATIENT
Start: 2023-01-31 | End: 2023-01-31

## 2023-01-31 RX ORDER — ASCORBIC ACID 500 MG
500 TABLET ORAL 2 TIMES DAILY
Status: DISCONTINUED | OUTPATIENT
Start: 2023-01-31 | End: 2023-02-01 | Stop reason: HOSPADM

## 2023-01-31 RX ORDER — ACETAMINOPHEN 325 MG/1
975 TABLET ORAL EVERY 8 HOURS SCHEDULED
Status: DISCONTINUED | OUTPATIENT
Start: 2023-01-31 | End: 2023-02-01 | Stop reason: HOSPADM

## 2023-01-31 RX ORDER — OXYCODONE HYDROCHLORIDE 5 MG/1
5 TABLET ORAL EVERY 6 HOURS PRN
Status: DISCONTINUED | OUTPATIENT
Start: 2023-01-31 | End: 2023-02-01 | Stop reason: HOSPADM

## 2023-01-31 RX ORDER — MORPHINE SULFATE 10 MG/ML
INJECTION, SOLUTION INTRAMUSCULAR; INTRAVENOUS AS NEEDED
Status: DISCONTINUED | OUTPATIENT
Start: 2023-01-31 | End: 2023-01-31 | Stop reason: HOSPADM

## 2023-01-31 RX ORDER — CHLORTHALIDONE 25 MG/1
25 TABLET ORAL DAILY
Status: DISCONTINUED | OUTPATIENT
Start: 2023-01-31 | End: 2023-02-01 | Stop reason: HOSPADM

## 2023-01-31 RX ORDER — SODIUM CHLORIDE, SODIUM LACTATE, POTASSIUM CHLORIDE, CALCIUM CHLORIDE 600; 310; 30; 20 MG/100ML; MG/100ML; MG/100ML; MG/100ML
125 INJECTION, SOLUTION INTRAVENOUS CONTINUOUS
Status: DISCONTINUED | OUTPATIENT
Start: 2023-01-31 | End: 2023-02-01 | Stop reason: HOSPADM

## 2023-01-31 RX ORDER — PANTOPRAZOLE SODIUM 40 MG/1
40 TABLET, DELAYED RELEASE ORAL DAILY
Status: DISCONTINUED | OUTPATIENT
Start: 2023-01-31 | End: 2023-02-01 | Stop reason: HOSPADM

## 2023-01-31 RX ORDER — PROPOFOL 10 MG/ML
INJECTION, EMULSION INTRAVENOUS CONTINUOUS PRN
Status: DISCONTINUED | OUTPATIENT
Start: 2023-01-31 | End: 2023-01-31

## 2023-01-31 RX ORDER — MAGNESIUM HYDROXIDE 1200 MG/15ML
LIQUID ORAL AS NEEDED
Status: DISCONTINUED | OUTPATIENT
Start: 2023-01-31 | End: 2023-01-31 | Stop reason: HOSPADM

## 2023-01-31 RX ORDER — FERROUS SULFATE 325(65) MG
325 TABLET ORAL 2 TIMES DAILY WITH MEALS
Status: DISCONTINUED | OUTPATIENT
Start: 2023-01-31 | End: 2023-02-01 | Stop reason: HOSPADM

## 2023-01-31 RX ORDER — LIDOCAINE HYDROCHLORIDE 10 MG/ML
INJECTION, SOLUTION EPIDURAL; INFILTRATION; INTRACAUDAL; PERINEURAL AS NEEDED
Status: DISCONTINUED | OUTPATIENT
Start: 2023-01-31 | End: 2023-01-31

## 2023-01-31 RX ORDER — AMLODIPINE BESYLATE 5 MG/1
5 TABLET ORAL DAILY
Status: DISCONTINUED | OUTPATIENT
Start: 2023-01-31 | End: 2023-02-01 | Stop reason: HOSPADM

## 2023-01-31 RX ORDER — FENTANYL CITRATE 50 UG/ML
INJECTION, SOLUTION INTRAMUSCULAR; INTRAVENOUS AS NEEDED
Status: DISCONTINUED | OUTPATIENT
Start: 2023-01-31 | End: 2023-01-31

## 2023-01-31 RX ORDER — BOSENTAN 125 MG/1
125 TABLET, FILM COATED ORAL 2 TIMES DAILY
Status: DISCONTINUED | OUTPATIENT
Start: 2023-01-31 | End: 2023-02-01 | Stop reason: HOSPADM

## 2023-01-31 RX ORDER — KETOROLAC TROMETHAMINE 30 MG/ML
INJECTION, SOLUTION INTRAMUSCULAR; INTRAVENOUS AS NEEDED
Status: DISCONTINUED | OUTPATIENT
Start: 2023-01-31 | End: 2023-01-31 | Stop reason: HOSPADM

## 2023-01-31 RX ORDER — OXYCODONE HYDROCHLORIDE 10 MG/1
10 TABLET ORAL EVERY 6 HOURS PRN
Status: DISCONTINUED | OUTPATIENT
Start: 2023-01-31 | End: 2023-02-01 | Stop reason: HOSPADM

## 2023-01-31 RX ORDER — ONDANSETRON 2 MG/ML
4 INJECTION INTRAMUSCULAR; INTRAVENOUS ONCE AS NEEDED
Status: DISCONTINUED | OUTPATIENT
Start: 2023-01-31 | End: 2023-01-31 | Stop reason: HOSPADM

## 2023-01-31 RX ORDER — LISINOPRIL 5 MG/1
5 TABLET ORAL DAILY
Status: DISCONTINUED | OUTPATIENT
Start: 2023-01-31 | End: 2023-02-01 | Stop reason: HOSPADM

## 2023-01-31 RX ORDER — CHLORHEXIDINE GLUCONATE 0.12 MG/ML
15 RINSE ORAL ONCE
Status: COMPLETED | OUTPATIENT
Start: 2023-01-31 | End: 2023-01-31

## 2023-01-31 RX ORDER — MAGNESIUM HYDROXIDE/ALUMINUM HYDROXICE/SIMETHICONE 120; 1200; 1200 MG/30ML; MG/30ML; MG/30ML
30 SUSPENSION ORAL EVERY 6 HOURS PRN
Status: DISCONTINUED | OUTPATIENT
Start: 2023-01-31 | End: 2023-02-01 | Stop reason: HOSPADM

## 2023-01-31 RX ORDER — PROPOFOL 10 MG/ML
INJECTION, EMULSION INTRAVENOUS AS NEEDED
Status: DISCONTINUED | OUTPATIENT
Start: 2023-01-31 | End: 2023-01-31

## 2023-01-31 RX ORDER — BUPIVACAINE HYDROCHLORIDE AND EPINEPHRINE 2.5; 5 MG/ML; UG/ML
INJECTION, SOLUTION EPIDURAL; INFILTRATION; INTRACAUDAL; PERINEURAL AS NEEDED
Status: DISCONTINUED | OUTPATIENT
Start: 2023-01-31 | End: 2023-01-31 | Stop reason: HOSPADM

## 2023-01-31 RX ORDER — MIDAZOLAM HYDROCHLORIDE 2 MG/2ML
INJECTION, SOLUTION INTRAMUSCULAR; INTRAVENOUS AS NEEDED
Status: DISCONTINUED | OUTPATIENT
Start: 2023-01-31 | End: 2023-01-31

## 2023-01-31 RX ORDER — DOCUSATE SODIUM 100 MG/1
100 CAPSULE, LIQUID FILLED ORAL 2 TIMES DAILY
Status: DISCONTINUED | OUTPATIENT
Start: 2023-01-31 | End: 2023-02-01 | Stop reason: HOSPADM

## 2023-01-31 RX ORDER — ENOXAPARIN SODIUM 100 MG/ML
40 INJECTION SUBCUTANEOUS
Status: DISCONTINUED | OUTPATIENT
Start: 2023-01-31 | End: 2023-02-01 | Stop reason: HOSPADM

## 2023-01-31 RX ORDER — BUPIVACAINE HYDROCHLORIDE 5 MG/ML
INJECTION, SOLUTION EPIDURAL; INTRACAUDAL AS NEEDED
Status: DISCONTINUED | OUTPATIENT
Start: 2023-01-31 | End: 2023-01-31

## 2023-01-31 RX ORDER — FENTANYL CITRATE/PF 50 MCG/ML
25 SYRINGE (ML) INJECTION
Status: DISCONTINUED | OUTPATIENT
Start: 2023-01-31 | End: 2023-01-31 | Stop reason: HOSPADM

## 2023-01-31 RX ADMIN — OXYCODONE HYDROCHLORIDE AND ACETAMINOPHEN 500 MG: 500 TABLET ORAL at 13:18

## 2023-01-31 RX ADMIN — SODIUM CHLORIDE, SODIUM LACTATE, POTASSIUM CHLORIDE, AND CALCIUM CHLORIDE: .6; .31; .03; .02 INJECTION, SOLUTION INTRAVENOUS at 07:35

## 2023-01-31 RX ADMIN — CEFAZOLIN SODIUM 2000 MG: 2 SOLUTION INTRAVENOUS at 07:34

## 2023-01-31 RX ADMIN — DOCUSATE SODIUM 100 MG: 100 CAPSULE ORAL at 13:18

## 2023-01-31 RX ADMIN — BUPIVACAINE HYDROCHLORIDE 10 ML: 5 INJECTION, SOLUTION EPIDURAL; INTRACAUDAL; PERINEURAL at 07:16

## 2023-01-31 RX ADMIN — ENOXAPARIN SODIUM 40 MG: 40 INJECTION SUBCUTANEOUS at 21:48

## 2023-01-31 RX ADMIN — FENTANYL CITRATE 50 MCG: 50 INJECTION INTRAMUSCULAR; INTRAVENOUS at 10:10

## 2023-01-31 RX ADMIN — PROPOFOL 140 MCG/KG/MIN: 10 INJECTION, EMULSION INTRAVENOUS at 07:54

## 2023-01-31 RX ADMIN — CEFAZOLIN SODIUM 2000 MG: 2 SOLUTION INTRAVENOUS at 16:01

## 2023-01-31 RX ADMIN — PHENYLEPHRINE HYDROCHLORIDE 2 DROP: 1 SPRAY NASAL at 08:01

## 2023-01-31 RX ADMIN — BUPIVACAINE 20 ML: 13.3 INJECTION, SUSPENSION, LIPOSOMAL INFILTRATION at 07:15

## 2023-01-31 RX ADMIN — Medication 1 TABLET: at 13:19

## 2023-01-31 RX ADMIN — CEFAZOLIN SODIUM 2000 MG: 2 SOLUTION INTRAVENOUS at 21:49

## 2023-01-31 RX ADMIN — Medication 12.5 MG: at 13:19

## 2023-01-31 RX ADMIN — FERROUS SULFATE TAB 325 MG (65 MG ELEMENTAL FE) 325 MG: 325 (65 FE) TAB at 16:01

## 2023-01-31 RX ADMIN — CHLORTHALIDONE 25 MG: 25 TABLET ORAL at 13:19

## 2023-01-31 RX ADMIN — SODIUM CHLORIDE 250 ML: 0.9 INJECTION, SOLUTION INTRAVENOUS at 22:06

## 2023-01-31 RX ADMIN — TAMSULOSIN HYDROCHLORIDE 0.4 MG: 0.4 CAPSULE ORAL at 16:01

## 2023-01-31 RX ADMIN — LIDOCAINE HYDROCHLORIDE 5 ML: 10 INJECTION, SOLUTION EPIDURAL; INFILTRATION; INTRACAUDAL at 07:54

## 2023-01-31 RX ADMIN — FOLIC ACID 1 MG: 1 TABLET ORAL at 13:19

## 2023-01-31 RX ADMIN — ACETAMINOPHEN 325MG 975 MG: 325 TABLET ORAL at 21:48

## 2023-01-31 RX ADMIN — LISINOPRIL 5 MG: 5 TABLET ORAL at 13:19

## 2023-01-31 RX ADMIN — MIDAZOLAM HYDROCHLORIDE 2 MG: 1 INJECTION, SOLUTION INTRAMUSCULAR; INTRAVENOUS at 07:26

## 2023-01-31 RX ADMIN — CHLORHEXIDINE GLUCONATE 0.12% ORAL RINSE 15 ML: 1.2 LIQUID ORAL at 06:38

## 2023-01-31 RX ADMIN — PROPOFOL 50 MG: 10 INJECTION, EMULSION INTRAVENOUS at 07:54

## 2023-01-31 RX ADMIN — FENTANYL CITRATE 50 MCG: 50 INJECTION INTRAMUSCULAR; INTRAVENOUS at 07:46

## 2023-01-31 RX ADMIN — AMLODIPINE BESYLATE 5 MG: 5 TABLET ORAL at 13:19

## 2023-01-31 RX ADMIN — PANTOPRAZOLE SODIUM 40 MG: 40 TABLET, DELAYED RELEASE ORAL at 13:19

## 2023-01-31 RX ADMIN — TRANEXAMIC ACID 1000 MG: 1 INJECTION, SOLUTION INTRAVENOUS at 08:02

## 2023-01-31 RX ADMIN — ACETAMINOPHEN 325MG 975 MG: 325 TABLET ORAL at 13:18

## 2023-01-31 NOTE — PHYSICAL THERAPY NOTE
PHYSICAL THERAPY EVALUATION  NAME:  Kirsten Prdie  DATE: 01/31/23    AGE:   59 y o   Mrn:   [id number]  ADMIT DX:  Primary osteoarthritis of right knee [M17 11]    Past Medical History:   Diagnosis Date   • Arthritis     Pt reports in knees and left hip   • Cancer ([facility] )     lymphoma in 2018- had chemotherapy   • Colon polyp    • COVID-19 2021   • Fatty liver    • Hypertension    • Kidney stone    • Prostate cancer ([facility] )      Length Of Stay: 0  Performed at least 2 patient identifiers during session: Name and Birthday  PHYSICAL THERAPY EVALUATION :      01/31/23 1231   PT Last Visit   PT Visit Date 01/31/23   Note Type   Note type Evaluation   Pain Assessment   Pain Assessment Tool FLACC   Pain Score 4   Pain Location/Orientation Orientation: Right;Location: Knee   Pain Rating: FLACC (Rest) - Face 0   Pain Rating: FLACC (Rest) - Legs 0   Pain Rating: FLACC (Rest) - Activity 0   Pain Rating: FLACC (Rest) - Cry 1   Pain Rating: FLACC (Rest) - Consolability 0   Score: FLACC (Rest) 1   Pain Rating: FLACC (Activity) - Face 1   Pain Rating: FLACC (Activity) - Legs 0   Pain Rating: FLACC (Activity) - Activity 0   Pain Rating: FLACC (Activity) - Cry 1   Pain Rating: FLACC (Activity) - Consolability 0   Score: FLACC (Activity) 2   Restrictions/Precautions   Weight Bearing Precautions Per Order Yes   RLE Weight Bearing Per Order WBAT   Other Precautions Chair Alarm; Bed Alarm;Multiple lines; Fall Risk;Pain   Home Living   Type of Home House  (1 ROSALBA)   Home Layout Two level;1/2 bath on main level;Bed/bath upstairs  (FF B HRs)   Bathroom Shower/Tub Tub/shower unit   Bathroom Toilet Standard   Home Equipment Walker;Cane;Crutches  (didn't use PTA)   Additional Comments Reports living in a 2 SH with 1 ROSALBA and FF B HRs to 2nd floor  Prior Function   Level of [place] Independent with ADLs; Independent with functional mobility; Independent with IADLS   Lives With Friend(s)   IADLs Independent with driving; Independent with meal prep; Independent with medication management   Falls in the last 6 months 0   Comments Reports being independent with mobility, ADLs and IADLs  General   Additional Pertinent History Pt is s/p R TKA this date 1/31/23  Handout provided for information on TKA  pt with tremor noted UEs  Cognition   Orientation Level Oriented X4   Following Commands Follows one step commands without difficulty   Comments increased time to respond  Subjective   Subjective "I had a lot of surgeries on my right hip "   RLE Assessment   RLE Assessment   (WFL except hip flexion to 90 degrees  strength 4/5 except hip flexion 3+/5)   LLE Assessment   LLE Assessment   (supine WFL except knee flexion to 45 degrees and knee ext -5 degrees  seated EOB, knee flexion to 90 degrees and knee ext to ~ -15 degrees  PROM knee ext 0 degrees )   Coordination   Rapid Alternating Movements   (increased time to complete)   Light Touch   RLE Light Touch Grossly intact   LLE Light Touch Grossly intact   Bed Mobility   Supine to Sit 6  Modified independent   Additional items Increased time required   Additional Comments HOB flat without bedrail  inc time to complete  Transfers   Sit to Stand   (SBA)   Additional items Increased time required;Verbal cues   Stand to Sit   (SBA)   Additional items Increased time required;Verbal cues   Stand pivot   (SBA)   Additional items Increased time required;Verbal cues   Additional Comments use of RW  sit<>stand with SBA with inc time with min cues or hand placement and technique  Ambulation/Elevation   Gait pattern Wide GO; Antalgic; Improper Weight shift; Short stride; Excessively slow; Step to; Step through pattern  (dec WB on R LE )   Gait Assistance   (SBA)   Additional items Verbal cues   Assistive Device Rolling walker   Distance ambulated 14'x1 with RW with SBA with slow antalgic pattern with decreased stance R LE, decreased L step length, step to to step through pattern with mod cues to not step into anterior aspect of RW  Balance   Static Sitting Normal   Dynamic Sitting Good   Static Standing Fair +   Dynamic Standing Fair   Ambulatory Fair -   Endurance Deficit   Endurance Deficit   (HR at rest in 80s to 90s  increased to 110s during activity and 107 bpm after  min HERNANDEZ )   Activity Tolerance   Activity Tolerance Patient limited by fatigue;Patient limited by pain   Medical Staff Made Aware Bryant MCDONALD Sensor   Nurse Made Aware Donna SEGUNDO   Assessment   Prognosis Good   Problem List Decreased strength;Decreased endurance; Impaired balance;Decreased mobility; Decreased range of motion;Decreased coordination;Decreased skin integrity;Pain   Barriers to Discharge None   Barriers to Discharge Comments pt has support from friend if needed   Goals   Patient Goals "Go home"   STG Expiration Date 02/14/23   PT Treatment Day 1   Plan   Treatment/Interventions ADL retraining;Functional transfer training;LE strengthening/ROM; Elevations; Therapeutic exercise; Endurance training;Patient/family training;Equipment eval/education; Bed mobility;Gait training; Compensatory technique education;Spoke to nursing;Spoke to case management;OT   PT Frequency 5-7x/wk  (BID prn)   Recommendation   PT Discharge Recommendation Home with outpatient rehabilitation   Equipment Recommended   (walker-pt has)   AM-PAC Basic Mobility Inpatient   Turning in Flat Bed Without Bedrails 4   Lying on Back to Sitting on Edge of Flat Bed Without Bedrails 4   Moving Bed to Chair 3   Standing Up From Chair Using Arms 3   Walk in Room 3   Climb 3-5 Stairs With Railing 3   Basic Mobility Inpatient Raw Score 20   Basic Mobility Standardized Score 43 99   Highest Level Of Mobility   JH-HLM Goal 6: Walk 10 steps or more   JH-HLM Achieved 6: Walk 10 steps or more   Additional Treatment Session   Start Time 1248   End Time 1257   Treatment Assessment Pt tolerated session fairly  He requires min cues for completion of LE TE  He appears fatigued   He will continue to benefit from PT services to maximize LOF  Equipment Use pt completed 1x15 of each LE TE of ankle DF/PF bilaterally, right knee ext with AROM from L LE prn, hip flexion and R quad set with 2-3 second hold  Disucced LE positioning to only have pillow veritcla to leg, not horizontal and further explained rolled pillow under ankle to increase knee extension but only sustain for ~ 10' to decrease risk of increased pressure on posterior knee structures  R LE elevated wiht pillow vertical under leg  pt verbalized understanding  End of Consult   Patient Position at End of Consult Bedside chair;Bed/Chair alarm activated; All needs within reach          01/31/23 1235 01/31/23 1242 01/31/23 1248   Vitals   Pulse  --  (!) 107 (!) 107   Blood Pressure 122/70  (supine, asymptomatic) 143/80  (seated EOB, asymptomatic) 144/79  (Seated OOB in recliner after ambualtion)   MAP (mmHg)  --   --  101   BP Location Right arm Right arm  --      (Please find full objective findings from PT assessment regarding body systems outlined above)  Assessment: Pt is a 59 y o  male seen for PT evaluation s/p admission to [address] [place] [address] on 1/31/2023 with Primary osteoarthritis of right knee s/p R TKA  Order placed for PT services    Upon evaluation: Pt is presenting with impaired functional mobility due to pain, decreased strength, decreased ROM, decreased endurance, impaired balance, impaired coordination, gait deviations, fall risk and impaired skin integrity requiring stand by assistance for transfers and stand by assistance for ambulation with RW  Pt's clinical presentation is currently unpredictable given the functional mobility deficits above, especially weakness, decreased ROM, decreased skin integrity, decreased endurance, gait deviations, pain, decreased activity tolerance, decreased functional mobility tolerance and decreased safety awareness, coupled with fall risks as indicated by AM-PAC 6-Clicks: 75/78 as well as impaired balance, polypharmacy and decreased safety awareness and combined with medical complications of pain impacting overall mobility status and need for input for mobility technique/safety  Pt's PMHx and comorbidities that may affect physical performance and progress include: HTN and orthopedic surgery (multiple hip surgeries per patient), heterotrophic ossification, arthritis, CM, prostate CA s/p prostatectomy, diffuse large B-Cell lymphoma  Personal factors affecting pt at time of IE include: step(s) to enter environment, multi-level environment, inability to perform IADLs, inability to perform ADLs, inability to navigate level surfaces without external assistance and inability to ambulate household distances  Pt will benefit from continued skilled PT services to address deficits as defined above and to maximize level of functional mobility to facilitate return toward PLOF and improved QOL  From PT/mobility standpoint, recommendation at time of d/c would be OP PT, with walker and with support prn from friend pending progress in order to reduce fall risk and maximize pt's functional independence and consistency with mobility in order to facilitate return to PLOF  Recommend trial with walker next 1-2 sessions and ther ex next 1-2 sessions  The patient's AM-PAC Basic Mobility Inpatient Short Form Raw Score is 20  A Raw score of greater than 16 suggests the patient may benefit from discharge to home  Please also refer to the recommendation of the Physical Therapist for safe discharge planning  Goals: Pt will: Perform bed mobility tasks with modified I to reposition in bed and prepare for transfers  Pt will perform transfers with modified I to decrease burden of care, decrease risk for falls and improve activity tolerance and prepare for ambulation   Pt will ambulate with RW or LRAD for >/= 150' with  modified I  to decrease burden of care, decrease risk for falls, improve activity tolerance and improve gait quality and to access home environment  Pt will complete 1 step with LRAD and >/= 12 steps with with bilateral handrails or 1 HR and LRAD with modified I to return to home with ROSALBA, return to [facility] home, decrease risk for falls, improve activity tolerance and improve gait quality  Pt will participate in objective balance assessment to determine baseline fall risk  Pt will increase B LE strength >/= 1/2 MMT grade to facilitate functional mobility        Seth Samayoa, PT,DPT

## 2023-01-31 NOTE — CONSULTS
[address] 1965, 59 y o  male MRN: [id number]  Unit/Bed#: -01 Encounter: [id number]  Primary Care Provider: No primary care provider on file  Date and time admitted to hospital: 1/31/2023  5:54 AM    Inpatient consult to Internal Medicine  Consult performed by: Jessica Santillan PA-C  Consult ordered by: Karen Jean-Baptiste        * Primary osteoarthritis of right knee  Assessment & Plan  · Patient is s/p surgery today with Dr Eddie Gilliam  · Seen by PT/OT deemed appropriate for home with outpatient therapy    · Continue recommendations per primary team   · Postoperative DVT prophylaxis guided by orthopedics, aspirin  · Pain control    Hypertension  Assessment & Plan  · Home medications include amlodipine, chlorthalidone, lisinopril, metoprolol  · Soft blood pressure postop  · Monitor, likely continue medications in AM     History of prostate cancer  Assessment & Plan  · Follows with urology, last appointment was this month  · PSA undetectable, continue outpatient follow-up  · Reports not taking Flomax however continue while hospitalized secondary to urinary retention with surgery  · Currently has Harden, remove in the morning        VTE Prophylaxis: VTE Score: 1 Low Risk (Score 0-2) - Encourage Ambulation  Aspirin post-op    Recommendations for Discharge:  · Aspirin as recommended by orthopedics for DVT prophylaxis post-op   · Follow therapy recommendations   · Continue outpatient medications   · Follow up with PCP and urology as scheduled     Counseling / Coordination of Care Time: 45 minutes Greater than 50% of total time spent on patient counseling and coordination of care  Collaboration of Care:  Were Recommendations Directly Discussed with Primary Treatment Team? Yes    History of Present Illness:  Harvey Mendes is a 59 y o  male who is originally admitted to the orthopedic surgery service service due to Osteoarthritis of right knee undergoing right arthroplasty today  We are consulted for Medical management of chronic conditions  Has a PMH of HTN and prostate cancer  Currently htn controlled post-op and at last urology appointment this month psa undetectable  The patient was seen post-op and is feels well, minimal pain  Review of Systems:  Review of Systems   Constitutional: Negative for activity change, chills and fever  HENT: Negative for congestion, rhinorrhea and sore throat  Eyes: Negative for visual disturbance  Respiratory: Negative for cough, chest tightness and shortness of breath  Cardiovascular: Negative for chest pain and palpitations  Gastrointestinal: Negative for abdominal pain, constipation, diarrhea, nausea and vomiting  Genitourinary: Positive for difficulty urinating  Negative for dysuria, frequency and urgency  Musculoskeletal: Positive for arthralgias  Negative for myalgias  Skin: Negative for rash  Neurological: Negative for seizures, syncope, weakness and headaches  All other systems reviewed and are negative  Past Medical and Surgical History:   Past Medical History:   Diagnosis Date   • Arthritis     Pt reports in knees and left hip   • Cancer ([facility] )     lymphoma in 2018- had chemotherapy   • Colon polyp    • COVID-19 2021   • Fatty liver    • Hypertension    • Kidney stone    • Prostate cancer [facility])        Past Surgical History:   Procedure Laterality Date   • COLON SURGERY     • HIP SURGERY Left     pt states he had 3 surgeries on left hip   • IR PORT REMOVAL     • JOINT REPLACEMENT     • PROSTATECTOMY     • REMOVAL VENOUS PORT (PORT-A-CATH)         Meds/Allergies:  PTA meds:   Prior to Admission Medications   Prescriptions Last Dose Informant Patient Reported? Taking?    Diclofenac Sodium (VOLTAREN) 1 % Past Month  No Yes   Sig: Apply 2 g topically 4 (four) times a day   Multiple Vitamins-Minerals (multivitamin with minerals) tablet 1/30/2023  No Yes   Sig: Take 1 tablet by mouth daily acetaminophen (TYLENOL) 500 mg tablet 1/30/2023  Yes Yes   Sig: Take 1,000 mg by mouth every 6 (six) hours as needed for mild pain   amLODIPine (NORVASC) 5 mg tablet 1/30/2023  Yes Yes   Sig: Take 5 mg by mouth daily   ascorbic acid (VITAMIN C) 500 MG tablet 1/30/2023  No Yes   Sig: Take 1 tablet (500 mg total) by mouth 2 (two) times a day   bosentan (TRACLEER) 125 MG tablet 1/30/2023  Yes Yes   Sig: Take 125 mg by mouth 2 (two) times a day   chlorthalidone 25 mg tablet 1/30/2023  Yes Yes   Sig: Take 25 mg by mouth daily   ferrous sulfate 324 (65 Fe) mg 1/30/2023  No Yes   Sig: Take 1 tablet (324 mg total) by mouth 2 (two) times a day before meals   folic acid (FOLVITE) 1 mg tablet 1/30/2023  No Yes   Sig: Take 1 tablet (1 mg total) by mouth daily   lisinopril (ZESTRIL) 5 mg tablet 1/30/2023  Yes Yes   Sig: Take 5 mg by mouth daily   metoprolol tartrate (LOPRESSOR) 25 mg tablet 1/30/2023 at 0800  Yes Yes   Sig: Take 12 5 mg by mouth every 12 (twelve) hours   tadalafil (CIALIS) 20 MG tablet Not Taking  Yes No   Sig: Take 20 mg by mouth   Patient not taking: Reported on 1/17/2023   tamsulosin (FLOMAX) 0 4 mg   Yes No   Patient not taking: Reported on 1/9/2023      Facility-Administered Medications: None       Allergies: No Known Allergies    Social History:  Marital Status:   Substance Use History:   Social History     Substance and Sexual Activity   Alcohol Use Not Currently     Social History     Tobacco Use   Smoking Status Former   • Types: Cigarettes   Smokeless Tobacco Never   Tobacco Comments    Pt quit in the 1990's     Social History     Substance and Sexual Activity   Drug Use Not Currently       Family History:  Family History   Problem Relation Age of Onset   • No Known Problems Mother    • No Known Problems Father        Physical Exam:   Vitals:   Blood Pressure: 101/63 (01/31/23 1530)  Pulse: 70 (01/31/23 1530)  Temperature: 97 9 °F (36 6 °C) (01/31/23 1530)  Temp Source: Temporal (01/31/23 1530)  Respirations: 14 (23 1530)  Height: 5' 6" (167 6 cm) (23 0453)  Weight - Scale: 79 4 kg (175 lb) (23 0639)  SpO2: 98 % (23 1530)    Physical Exam  Vitals and nursing note reviewed  Constitutional:       General: He is not in acute distress  Appearance: Normal appearance  HENT:      Head: Normocephalic and atraumatic  Nose: No congestion  Mouth/Throat:      Mouth: Mucous membranes are moist    Eyes:      Conjunctiva/sclera: Conjunctivae normal    Cardiovascular:      Rate and Rhythm: Normal rate and regular rhythm  Pulses: Normal pulses  Heart sounds: Normal heart sounds  No murmur heard  Pulmonary:      Effort: Pulmonary effort is normal  No respiratory distress  Breath sounds: Normal breath sounds  Abdominal:      General: Bowel sounds are normal       Palpations: Abdomen is soft  Tenderness: There is no abdominal tenderness  Musculoskeletal:         General: Tenderness (right knee) present  Right lower leg: No edema  Left lower leg: No edema  Skin:     General: Skin is warm and dry  Neurological:      Mental Status: He is alert and oriented to person, place, and time  Additional Data:   Lab Results:                    Lab Results   Component Value Date/Time    HGBA1C 6 5 (H) 2023 11:18 AM    HGBA1C 5 5 2021 08:31 AM    HGBA1C 6 5 (H) 2021 10:36 AM    HGBA1C 6 3 (H) 2021 12:20 PM     Results from last 7 days   Lab Units 23  1143 23  1102   POC GLUCOSE mg/dl 92 91           Imaging: No pertinent imaging reviewed  No orders to display       EKG, Pathology, and Other Studies Reviewed on Admission:   · EK/12 - sinus with 1st degree AV block   ** Please Note: This note may have been constructed using a voice recognition system   **

## 2023-01-31 NOTE — ANESTHESIA PROCEDURE NOTES
Spinal Block    Patient location during procedure: OR  Start time: 1/31/2023 7:45 AM  Staffing  Performed: Anesthesiologist   Anesthesiologist: Aman Tompkins MD  Preanesthetic Checklist  Completed: patient identified, IV checked, site marked, risks and benefits discussed, surgical consent, monitors and equipment checked, pre-op evaluation and timeout performed  Spinal Block  Patient position: sitting  Prep: DuraPrep  Patient monitoring: continuous pulse ox, heart rate and frequent blood pressure checks  Approach: midline  Location: L3-4  Injection technique: single-shot  Needle  Needle type: pencil-tip   Needle gauge: 25 G  Assessment  Sensory level: T4  Injection Assessment:  negative aspiration for heme, no paresthesia on injection and positive aspiration for clear CSF    Post-procedure:  site cleaned

## 2023-01-31 NOTE — PLAN OF CARE
Problem: PHYSICAL THERAPY ADULT  Goal: Performs mobility at highest level of function for planned discharge setting  See evaluation for individualized goals  Description: Treatment/Interventions: ADL retraining, Functional transfer training, LE strengthening/ROM, Elevations, Therapeutic exercise, Endurance training, Patient/family training, Equipment eval/education, Bed mobility, Gait training, Compensatory technique education, Spoke to nursing, Spoke to case management, OT  Equipment Recommended:  (walker-pt has)       See flowsheet documentation for full assessment, interventions and recommendations  Note: Prognosis: Good  Problem List: Decreased strength, Decreased endurance, Impaired balance, Decreased mobility, Decreased range of motion, Decreased coordination, Decreased skin integrity, Pain  Assessment: Pt is a 59 y o  male seen for PT evaluation s/p admission to [address] [place] [address] on 1/31/2023 with Primary osteoarthritis of right knee s/p R TKA  Order placed for PT services  Upon evaluation: Pt is presenting with impaired functional mobility due to pain, decreased strength, decreased ROM, decreased endurance, impaired balance, impaired coordination, gait deviations, fall risk and impaired skin integrity requiring stand by assistance for transfers and stand by assistance for ambulation with RW  Pt's clinical presentation is currently unpredictable given the functional mobility deficits above, especially weakness, decreased ROM, decreased skin integrity, decreased endurance, gait deviations, pain, decreased activity tolerance, decreased functional mobility tolerance and decreased safety awareness, coupled with fall risks as indicated by AM-PAC 6-Clicks: 37/29 as well as impaired balance, polypharmacy and decreased safety awareness and combined with medical complications of pain impacting overall mobility status and need for input for mobility technique/safety    Pt's PMHx and comorbidities that may affect physical performance and progress include: HTN and orthopedic surgery (multiple hip surgeries per patient), heterotrophic ossification, arthritis, CM, prostate CA s/p prostatectomy, diffuse large B-Cell lymphoma  Personal factors affecting pt at time of IE include: step(s) to enter environment, multi-level environment, inability to perform IADLs, inability to perform ADLs, inability to navigate level surfaces without external assistance and inability to ambulate household distances  Pt will benefit from continued skilled PT services to address deficits as defined above and to maximize level of functional mobility to facilitate return toward PLOF and improved QOL  From PT/mobility standpoint, recommendation at time of d/c would be OP PT, with walker and with support prn from friend pending progress in order to reduce fall risk and maximize pt's functional independence and consistency with mobility in order to facilitate return to PLOF  Recommend trial with walker next 1-2 sessions and ther ex next 1-2 sessions  Barriers to Discharge: None  Barriers to Discharge Comments: pt has support from friend if needed  PT Discharge Recommendation: Home with outpatient rehabilitation    See flowsheet documentation for full assessment

## 2023-01-31 NOTE — ANESTHESIA POSTPROCEDURE EVALUATION
Post-Op Assessment Note    CV Status:  Stable  Pain Score: 0    Pain management: adequate     Mental Status:  Alert and awake   Hydration Status:  Euvolemic   PONV Controlled:  Controlled   Airway Patency:  Patent   Two or more mitigation strategies used for obstructive sleep apnea   Post Op Vitals Reviewed: Yes      Staff: CRNA         No notable events documented      /54 (01/31/23 1020)    Temp 97 5 °F (36 4 °C) (01/31/23 1020)    Pulse 95 (01/31/23 1020)   Resp 20 (01/31/23 1020)    SpO2 100 % (01/31/23 1020)

## 2023-01-31 NOTE — PLAN OF CARE
Problem: PHYSICAL THERAPY ADULT  Goal: Performs mobility at highest level of function for planned discharge setting  See evaluation for individualized goals  Description: Treatment/Interventions: ADL retraining, Functional transfer training, LE strengthening/ROM, Elevations, Therapeutic exercise, Endurance training, Patient/family training, Equipment eval/education, Bed mobility, Gait training, Compensatory technique education, Spoke to nursing, Spoke to case management, OT  Equipment Recommended:  (walker-pt has)       See flowsheet documentation for full assessment, interventions and recommendations  [id number] by Nick Manriquez PT  Note: Prognosis: Good  Problem List: Decreased strength, Decreased endurance, Impaired balance, Decreased mobility, Decreased range of motion, Decreased coordination, Decreased skin integrity, Pain  Pt tolerated session fairly  He requires min cues for completion of LE TE  He appears fatigued  He will continue to benefit from PT services to maximize LOF  Barriers to Discharge: None  Barriers to Discharge Comments: pt has support from friend if needed  PT Discharge Recommendation: Home with outpatient rehabilitation    See flowsheet documentation for full assessment

## 2023-01-31 NOTE — OP NOTE
OPERATIVE REPORT  PATIENT NAME: William Hendrickson    :  1965  MRN: [id number]  Pt Location: [facility] OR ROOM 02    SURGERY DATE: 2023    Surgeon(s) and Role:     * Iza Lopez - Primary     * Edy Serrano PA-C - Assisting    Preop Diagnosis:  Primary osteoarthritis of right knee [M17 11]    Post-Op Diagnosis Codes:     * Primary osteoarthritis of right knee [M17 11]    Procedure(s):  Right - ARTHROPLASTY KNEE TOTAL    Fluids: 800 cc    Urine: 225 cc    Estimated Blood Loss:   Minimal    Drains:  Urethral Catheter Latex;Straight-tip 16 Fr  (Active)   Harden Care Done 23 0900   Number of days: 0       Anesthesia Type:   Spinal with adductor canal block and local utilizing combination of 0 25% bupivacaine with epinephrine, morphine and Toradol    Operative Indications:  Primary osteoarthritis of right knee [M17 11]  Hermelinda Archer is a 53-year-old male with right knee pain refractory to conservative measures with x-rays demonstrating advanced degenerative disease  The risk, benefits, options and alternatives of treatment were discussed and he elects to proceed with right knee replacement arthroplasty  Operative Findings: At the time of the procedure, the patient was noted to have advanced degenerative disease especially in the medial compartment where there was complete loss of articular surface the medial femoral condyle and tibial plateau  There were significant changes noted laterally and at the patellofemoral joint as well  At the conclusion of the procedure, a stable knee replacement arthroplasty was performed utilizing the DePuy Anctuune system with a size 6 femoral component, size 5 tibial plate and 5 mm insert with a 5 mm all polypatella  At the conclusion of the procedure, the knee could be fully extended and flex beyond 130 degrees with good patella tracking noted  Collateral ligaments were stable in full extension, 30 degrees of flexion and 90 degrees of flexion      Complications: None    Procedure and Technique:  Jordy Johnson was taken to the operating room and administered a spinal anesthetic after an abductor canal block plied in the holding area  Well-padded tourniquet was applied to the right lower extremity  A time-out was performed and preoperative antibiotics were administered  The right lower extremity was then prepped and draped in standard fashion  The limb was elevated, exsanguinated with an Esmarch bandage and the tourniquet then inflated to 300 mm of mercury  A midline incision was made with sharp dissection carried down through the skin and subcutaneous tissues  Bleeding was controlled with cautery  Soft tissues were dissected down through the subcutaneous tissues and medial and lateral skin flaps elevated  A medial parapatellar arthrotomy was then performed in routine fashion  Synovectomy was performed and the proximal tibia was exposed subperiosteally, meniscal remnants and ACL remnants excised  Osteophytes were removed  The patella was then everted  The distal femur was accessed in standard fashion with the drill and the guide for distal femoral cut placed and secured  The distal femoral cut was then performed in standard fashion  The sizing guide was then placed, appropriately positioned and a size 6 cutting guide chosen  Anterior, posterior, chamfer and trochlear cuts were then performed in standard fashion  The trial femoral component was placed over the cut surface and noted to fit nicely  The extramedullary guide was used for the tibial cut  This was position for all aspects of the cut and then secured in position  The proximal tibial cut was then made in standard fashion  A size 5 trial seemed to fit best over the cut surface  A trial reduction was then performed utilizing the size 6 femoral component, the size 5 tibial base plate and a 5 mm insert   The knee was noted to have good range of motion with full extension and flexion beyond 120° with good collateral stability in full extension and mid range flexion  The patella was calibrated and then the cutting guide secured to the patella  The patellar resection was then performed in routine fashion and a size 35 mm patellar trial placed over the cut surface, drilled and the patellar trial component placed  Once again, the knee was placed through range of motion  The patella was noted to track nicely in the trochlear groove  All trial components were then removed  Local anesthetic was used to infiltrate the pericapsular tissues as well as the quadricep tendon and collateral ligament regions  The knee was irrigated with copious amounts of saline solution  The of femoral opening to the intramedullary canal was plugged with bone and cement was prepared  The components were then cemented in place with excess cement removed  Once the cement had cured, the knee was inspected for any residual cement  Once there was no evidence of any residual cement, the final articular surface was impacted into position  The knee was placed through range of motion with good stability noted at full extension and mid range flexion  The knee could be fully extended and flexion beyond 120° with good patellar tracking was noted  Local anesthetic was infiltrated into the soft tissues  The extensor mechanism was then repaired with 1  Vicryl suture  The knee was again irrigated and the knee range of motion checked  Once again, the knee could be placed from full extension to greater than 120° of flexion with good collateral stability at full extension and mid range flexion  The patella was noted to track well within the trochlear notch  The subcutaneous tissues were approximated with 2 O Vicryl  The skin was secured with glue  Dressings were then applied consisting of Mepilex and a double 6 inch Ace bandage from toes to groin  The patient was then transferred to the recovery room in stable and satisfactory postoperative condition     I was present for the entire procedure, A qualified resident physician was not available and A physician assistant was required during the procedure for retraction tissue handling,dissection and suturing    Patient Disposition:  PACU         SIGNATURE: Nicki Come  DATE: January 31, 2023  TIME: 10:16 AM

## 2023-01-31 NOTE — PLAN OF CARE
Problem: MOBILITY - ADULT  Goal: Maintain or return to baseline ADL function  Description: INTERVENTIONS:  -  Assess patient's ability to carry out ADLs; assess patient's baseline for ADL function and identify physical deficits which impact ability to perform ADLs (bathing, care of mouth/teeth, toileting, grooming, dressing, etc )  - Assess/evaluate cause of self-care deficits   - Assess range of motion  - Assess patient's mobility; develop plan if impaired  - Assess patient's need for assistive devices and provide as appropriate  - Encourage maximum independence but intervene and supervise when necessary  - Involve family in performance of ADLs  - Assess for home care needs following discharge   - Consider OT consult to assist with ADL evaluation and planning for discharge  - Provide patient education as appropriate  Outcome: Progressing  Goal: Maintains/Returns to pre admission functional level  Description: INTERVENTIONS:  - Perform BMAT or MOVE assessment daily    - Set and communicate daily mobility goal to care team and patient/family/caregiver  - Collaborate with rehabilitation services on mobility goals if consulted  - Perform Range of Motion 2 times a day  - Reposition patient every 2 hours    - Dangle patient 2 times a day  - Stand patient 2 times a day  - Ambulate patient 2 times a day  - Out of bed to chair 2 times a day   - Out of bed for meals 2 times a day  - Out of bed for toileting  - Record patient progress and toleration of activity level   Outcome: Progressing     Problem: PAIN - ADULT  Goal: Verbalizes/displays adequate comfort level or baseline comfort level  Description: Interventions:  - Encourage patient to monitor pain and request assistance  - Assess pain using appropriate pain scale  - Administer analgesics based on type and severity of pain and evaluate response  - Implement non-pharmacological measures as appropriate and evaluate response  - Consider cultural and social influences on pain and pain management  - Notify physician/advanced practitioner if interventions unsuccessful or patient reports new pain  Outcome: Progressing     Problem: INFECTION - ADULT  Goal: Absence or prevention of progression during hospitalization  Description: INTERVENTIONS:  - Assess and monitor for signs and symptoms of infection  - Monitor lab/diagnostic results  - Monitor all insertion sites, i e  indwelling lines, tubes, and drains  - Monitor endotracheal if appropriate and nasal secretions for changes in amount and color  - [place] appropriate cooling/warming therapies per order  - Administer medications as ordered  - Instruct and encourage patient and family to use good hand hygiene technique  - Identify and instruct in appropriate isolation precautions for identified infection/condition  Outcome: Progressing     Problem: SAFETY ADULT  Goal: Maintain or return to baseline ADL function  Description: INTERVENTIONS:  -  Assess patient's ability to carry out ADLs; assess patient's baseline for ADL function and identify physical deficits which impact ability to perform ADLs (bathing, care of mouth/teeth, toileting, grooming, dressing, etc )  - Assess/evaluate cause of self-care deficits   - Assess range of motion  - Assess patient's mobility; develop plan if impaired  - Assess patient's need for assistive devices and provide as appropriate  - Encourage maximum independence but intervene and supervise when necessary  - Involve family in performance of ADLs  - Assess for home care needs following discharge   - Consider OT consult to assist with ADL evaluation and planning for discharge  - Provide patient education as appropriate  Outcome: Progressing  Goal: Maintains/Returns to pre admission functional level  Description: INTERVENTIONS:  - Perform BMAT or MOVE assessment daily    - Set and communicate daily mobility goal to care team and patient/family/caregiver     - Collaborate with rehabilitation services on mobility goals if consulted  - Perform Range of Motion 2 times a day  - Reposition patient every 2 hours    - Dangle patient 2 times a day  - Stand patient 2 times a day  - Ambulate patient 2 times a day  - Out of bed to chair 2 times a day   - Out of bed for meals 2 times a day  - Out of bed for toileting  - Record patient progress and toleration of activity level   Outcome: Progressing  Goal: Patient will remain free of falls  Description: INTERVENTIONS:  -  Assess patient's ability to carry out ADLs; assess patient's baseline for ADL function and identify physical deficits which impact ability to perform ADLs (bathing, care of mouth/teeth, toileting, grooming, dressing, etc )  - Assess/evaluate cause of self-care deficits   - Assess range of motion  - Assess patient's mobility; develop plan if impaired  - Assess patient's need for assistive devices and provide as appropriate  - Encourage maximum independence but intervene and supervise when necessary  - Involve family in performance of ADLs  - Assess for home care needs following discharge   - Consider OT consult to assist with ADL evaluation and planning for discharge  - Provide patient education as appropriate  Outcome: Progressing     Problem: DISCHARGE PLANNING  Goal: Discharge to home or other facility with appropriate resources  Description: INTERVENTIONS:  - Identify barriers to discharge w/patient and caregiver  - Arrange for needed discharge resources and transportation as appropriate  - Identify discharge learning needs (meds, wound care, etc )  - Arrange for interpretive services to assist at discharge as needed  - Refer to Case Management Department for coordinating discharge planning if the patient needs post-hospital services based on physician/advanced practitioner order or complex needs related to functional status, cognitive ability, or social support system  Outcome: Progressing     Problem: Knowledge Deficit  Goal: Patient/family/caregiver demonstrates understanding of disease process, treatment plan, medications, and discharge instructions  Description: Complete learning assessment and assess knowledge base    Interventions:  - Provide teaching at level of understanding  - Provide teaching via preferred learning methods  Outcome: Progressing

## 2023-01-31 NOTE — ANESTHESIA PROCEDURE NOTES
Peripheral Block    Patient location during procedure: holding area  Start time: 1/31/2023 7:15 AM  Reason for block: at surgeon's request and post-op pain management  Staffing  Performed: Anesthesiologist   Anesthesiologist: Netta Perez MD  Preanesthetic Checklist  Completed: patient identified, IV checked, site marked, risks and benefits discussed, surgical consent, monitors and equipment checked, pre-op evaluation and timeout performed  Peripheral Block  Patient position: supine  Prep: ChloraPrep  Patient monitoring: heart rate, continuous pulse ox and frequent blood pressure checks  Block type: adductor canal block  Laterality: right  Injection technique: single-shot  Ultrasound permanent image saved  Needle  Needle type: Stimuplex   Needle gauge: 21 G  Needle length: 10 cm  Needle localization: ultrasound guidance  Test dose: negative  Assessment  Injection assessment: incremental injection, local visualized surrounding nerve on ultrasound, negative aspiration for CSF, negative aspiration for heme and no paresthesia on injection  Paresthesia pain: none  Heart rate change: no  Slow fractionated injection: yes  Post-procedure:  site cleaned  patient tolerated the procedure well with no immediate complications

## 2023-01-31 NOTE — INTERVAL H&P NOTE
H&P reviewed  After examining the patient I find no changes in the patients condition since the H&P had been written      Vitals:    01/31/23 0639   BP: 135/60   Pulse: 95   Resp: 20   Temp: 98 3 °F (36 8 °C)   SpO2: 99%

## 2023-01-31 NOTE — ASSESSMENT & PLAN NOTE
· Patient is s/p surgery today with Dr Nichol Ferreira  · Seen by PT/OT deemed appropriate for home with outpatient therapy    · Continue recommendations per primary team   · Postoperative DVT prophylaxis guided by orthopedics, aspirin  · Pain control

## 2023-01-31 NOTE — OCCUPATIONAL THERAPY NOTE
Occupational Therapy Evaluation     Patient Name: William Summers  Today's Date: 1/31/2023  Problem List  Principal Problem:    Primary osteoarthritis of right knee  Active Problems:    Hypertension    Past Medical History  Past Medical History:   Diagnosis Date    Arthritis     Pt reports in knees and left hip    Cancer ([address] )     lymphoma in 2018- had chemotherapy    Colon polyp     COVID-19     2021    Fatty liver     Hypertension     Kidney stone     Prostate cancer [facility])      Past Surgical History  Past Surgical History:   Procedure Laterality Date    COLON SURGERY      HIP SURGERY Left     pt states he had 3 surgeries on left hip    IR PORT REMOVAL      JOINT REPLACEMENT      PROSTATECTOMY      REMOVAL VENOUS PORT (PORT-A-CATH)           01/31/23 1230   Note Type   Note type Evaluation   Pain Assessment   Pain Assessment Tool FLACC   Pain Score 4   Pain Location/Orientation Orientation: Right;Location: Knee   Pain Rating: FLACC (Rest) - Face 1   Pain Rating: FLACC (Rest) - Legs 0   Pain Rating: FLACC (Rest) - Activity 0   Pain Rating: FLACC (Rest) - Cry 0   Pain Rating: FLACC (Rest) - Consolability 0   Score: FLACC (Rest) 1   Pain Rating: FLACC (Activity) - Face 1   Pain Rating: FLACC (Activity) - Legs 0   Pain Rating: FLACC (Activity) - Activity 0   Pain Rating: FLACC (Activity) - Cry 1   Pain Rating: FLACC (Activity) - Consolability 0   Score: FLACC (Activity) 2   Restrictions/Precautions   Other Precautions Chair Alarm; Bed Alarm; Fall Risk;Pain;Multiple lines   Home Living   Type of Home House  (1 ROSALBA)   Home Layout Two level;1/2 bath on main level;Bed/bath upstairs  (FF B HR)   Bathroom Shower/Tub Tub/shower unit   Bathroom Toilet Standard   Home Equipment Walker;Cane;Crutches  (did not use PTA)   Additional Comments Pt reports living at home with a friend  1 ROSALBA FF to 2nd floor to main bedroom/full bathroom  1/2 bath on 1st floor   Prior Function   Level of [place] Independent with ADLs; Independent with functional mobility; Independent with IADLS   Lives With Friend(s)   IADLs Independent with driving; Independent with meal prep; Independent with medication management   Falls in the last 6 months 0   Comments Pt reports completing ADLs, IADLs, functional ambulates and community mobility + driving @ I    ADL   Where Assessed Edge of bed   Grooming Assistance 6  Modified Independent   Grooming Deficit Setup   UB Dressing Assistance 6  Modified independent   UB Dressing Deficit Setup   LB Dressing Assistance   (SBA)   LB Dressing Deficit Requires assistive device for steadying;Verbal cueing;Supervision/safety; Increased time to complete   Additional Comments Pt completing ADL tasks while esated at EOB  UB Dressing and grooming tasks @ Mod I after set-up  LB Dressing @ SBA includign donning socks/pants around feet and for balance/asfety while completing CM around waist with UB supported from RW PRN  Pt completed donning socks by pending over to touch toes, SBA required d/t safety concerns of Pt falling forward although Pt was able to pull self back to EOB with minimal difficulty noted  Pt also reports having a Hip Kit at home (sock aide, reacher and dressing stick) to assist with LB dressing if needed   Bed Mobility   Supine to Sit 6  Modified independent   Additional items Increased time required   Transfers   Sit to Stand   (SBA)   Additional items Increased time required;Verbal cues   Stand to Sit   (SBA)   Additional items Increased time required;Verbal cues   Stand pivot   (SBA)   Additional items Increased time required;Verbal cues   Additional Comments Pt completing functional transfers with use of RW for UB support   SBA for STS and SPT with increased time and vc'ing for RW management and sequencing   Balance   Static Sitting Normal   Dynamic Sitting Good   Static Standing Fair +   Dynamic Standing Fair   Activity Tolerance   Activity Tolerance Patient limited by fatigue;Patient limited by pain   Medical Staff Made Aware Spoke with PT, Kyle Fallon   Nurse Made Aware Spoke with RN, dannielle CALDERON Assessment   RUE Assessment WFL   LUE Assessment   LUE Assessment WFL   Hand Function   Gross Motor Coordination Functional   Fine Motor Coordination Functional   Sensation   Light Touch No apparent deficits   Cognition   Overall Cognitive Status WFL   Arousal/Participation Alert; Responsive; Cooperative   Attention Attends with cues to redirect   Orientation Level Oriented X4   Memory Within functional limits   Following Commands Follows one step commands without difficulty   Assessment   Limitation Decreased UE strength;Decreased ADL status; Decreased Safe judgement during ADL;Decreased endurance;Decreased self-care trans;Decreased high-level ADLs   Prognosis Good   Assessment Pt is a 59 y o  male, admitted to [address] [place] [address] 1/31/2023 for an outpatient surgical procedure  Dx: primary osteoarthritis of R knee  Pt underwent - "Right - ARTHROPLASTY KNEE TOTAL"  Pt with PMHx impacting their performance during ADL tasks, including: HTN  Prior to admission to the hospital Pt was performing ADLs without physical assistance  IADLs without physical assistance  Functional transfers/ambulation without physical assistance  Cognitive status was PTA was intact  OT order placed to assess Pt's ADLs, cognitive status, and performance during functional tasks in order to maximize safety and independence while making most appropriate d/c recommendations   Pt's clinical presentation is currently unstable/unpredictable given new onset deficits that effect Pt's occupational performance and ability to safely return to OF including decrease activity tolerance, decrease standing balance, decrease performance during ADL tasks, decrease safety awareness , increased pain, decrease generalized strength, decrease activity engagement, decrease performance during functional transfers and decreased alertness combined with medical complications of pain impacting overall mobility status, edema/swelling, decreased skin integrity, fear/retreat and need for input for mobility technique/safety  Vitals recorded during session, please refer to chart below  Personal factors affecting Pt at time of initial evaluation include: multi-level environment, availability as recommended, inability to perform IADLs, new need for AD, inability to navigate community distances and limited insight into impairments  Pt will benefit from continued skilled OT services to address deficits as defined above and to maximize level independence/participation during ADLs and functional tasks to facilitate return toward PLOF and improved quality of life  From an occupational therapy standpoint, recommendation at time of d/c would be no further OT needs  Plan   Treatment Interventions ADL retraining;Functional transfer training;UE strengthening/ROM; Endurance training;Patient/family training;Equipment evaluation/education; Neuromuscular reeducation; Compensatory technique education;Continued evaluation; Energy conservation; Activityengagement   Goal Expiration Date 02/14/23   OT Frequency 3-5x/wk   Recommendation   OT Discharge Recommendation No rehabilitation needs   AM-PAC Daily Activity Inpatient   Lower Body Dressing 3   Bathing 3   Toileting 3   Upper Body Dressing 4   Grooming 3   Eating 4   Daily Activity Raw Score 20   Daily Activity Standardized Score (Calc for Raw Score >=11) 42 03   AM-PAC Applied Cognition Inpatient   Following a Speech/Presentation 3   Understanding Ordinary Conversation 4   Taking Medications 3   Remembering Where Things Are Placed or Put Away 4   Remembering List of 4-5 Errands 4   Taking Care of Complicated Tasks 3   Applied Cognition Raw Score 21   Applied Cognition Standardized Score 44 3        01/31/23 1229 01/31/23 1248   Vitals   Pulse 90 (!) 107   Respirations 18  --    Blood Pressure 120/80  (Supine in bed - Asymptomatic) 144/79  (Seated OOB in recliner after ambualtion)   MAP (mmHg) 93 101   Oxygen Therapy   SpO2 98 % 98 %     The patient's raw score on the AM-PAC Daily Activity inpatient short form is 20, standardized score is 42 03, greater than 39 4  Patients at this level are likely to benefit from DC to home  Please refer to the recommendation of the Occupational Therapist for safe DC planning  Pt goals to be met by 2/14/2023    Pt will demonstrate ability to complete LB dressing @ Mod I in order to increase safety and independence during meaningful tasks  Pt will demonstrate ability to chauncey/doff socks/shoes while sitting EOB @ Mod I with AD PRN in order to increase safety and independence during meaningful tasks  Pt will demonstrate ability to complete toileting tasks including CM and pericare @ Mod I in order to increase safety and independence during meaningful tasks  Pt will demonstrate ability to complete EOB, chair, toilet/commode transfers @ Mod I in order to increase performance and participation during functional tasks  Pt will demonstrate ability to stand for 10+ minutes while maintaining G balance with use of RW for UB support PRN  Pt will demonstrate ability to tolerate 30-35 minute OT session with no vc'ing for deep breathing or use of energy conservation techniques in order to increase activity tolerance during functional tasks  Pt will demonstrate Good carryover of use of energy conservation/compensatory strategies during ADLs and functional tasks in order to increase safety and reduce risk for falls  Pt will demonstrate Good attention and participation in continued evaluation of functional ambulation house hold distances in order to assist with safe d/c planning  Pt will attend to continued cognitive assessments 100% of the time in order to provide most appropriate d/c recommendations  Pt will follow 100% simple 2-step commands and be A&O x4 consistently with environmental cues to increase participation in functional activities     Pt will identify 3 areas of interest/hobbies and 1 intervention on how to incorporate into daily life in order to increase interaction with environment and peers as well as increase participation in meaningful tasks  Pt will demonstrate 100% carryover of BUE HEP in order to increase BUE MS and increase performance during functional tasks upon d/c home      Jaynee Duane OTR/L

## 2023-01-31 NOTE — PLAN OF CARE
Problem: OCCUPATIONAL THERAPY ADULT  Goal: Performs self-care activities at highest level of function for planned discharge setting  See evaluation for individualized goals  Description: Treatment Interventions: ADL retraining, Functional transfer training, UE strengthening/ROM, Endurance training, Patient/family training, Equipment evaluation/education, Neuromuscular reeducation, Compensatory technique education, Continued evaluation, Energy conservation, Activityengagement          See flowsheet documentation for full assessment, interventions and recommendations  Note: Limitation: Decreased UE strength, Decreased ADL status, Decreased Safe judgement during ADL, Decreased endurance, Decreased self-care trans, Decreased high-level ADLs  Prognosis: Good  Assessment: Pt is a 59 y o  male, admitted to [address] [place] [address] 1/31/2023 for an outpatient surgical procedure  Dx: primary osteoarthritis of R knee  Pt underwent - "Right - ARTHROPLASTY KNEE TOTAL"  Pt with PMHx impacting their performance during ADL tasks, including: HTN  Prior to admission to the hospital Pt was performing ADLs without physical assistance  IADLs without physical assistance  Functional transfers/ambulation without physical assistance  Cognitive status was PTA was intact  OT order placed to assess Pt's ADLs, cognitive status, and performance during functional tasks in order to maximize safety and independence while making most appropriate d/c recommendations   Pt's clinical presentation is currently unstable/unpredictable given new onset deficits that effect Pt's occupational performance and ability to safely return to OF including decrease activity tolerance, decrease standing balance, decrease performance during ADL tasks, decrease safety awareness , increased pain, decrease generalized strength, decrease activity engagement, decrease performance during functional transfers and decreased alertness combined with medical complications of pain impacting overall mobility status, edema/swelling, decreased skin integrity, fear/retreat and need for input for mobility technique/safety  Vitals recorded during session, please refer to chart below  Personal factors affecting Pt at time of initial evaluation include: multi-level environment, availability as recommended, inability to perform IADLs, new need for AD, inability to navigate community distances and limited insight into impairments  Pt will benefit from continued skilled OT services to address deficits as defined above and to maximize level independence/participation during ADLs and functional tasks to facilitate return toward PLOF and improved quality of life  From an occupational therapy standpoint, recommendation at time of d/c would be no further OT needs       OT Discharge Recommendation: No rehabilitation needs

## 2023-01-31 NOTE — ASSESSMENT & PLAN NOTE
· Home medications include amlodipine, chlorthalidone, lisinopril, metoprolol  · Soft blood pressure postop  · Monitor, likely continue medications in AM

## 2023-01-31 NOTE — ASSESSMENT & PLAN NOTE
· Follows with urology, last appointment was this month  · PSA undetectable, continue outpatient follow-up  · Reports not taking Flomax however continue while hospitalized secondary to urinary retention with surgery  · Currently has Harden, remove in the morning

## 2023-02-01 VITALS
OXYGEN SATURATION: 94 % | SYSTOLIC BLOOD PRESSURE: 110 MMHG | RESPIRATION RATE: 18 BRPM | WEIGHT: 175 LBS | HEIGHT: 66 IN | TEMPERATURE: 98.8 F | BODY MASS INDEX: 28.12 KG/M2 | DIASTOLIC BLOOD PRESSURE: 66 MMHG | HEART RATE: 105 BPM

## 2023-02-01 LAB
ANION GAP SERPL CALCULATED.3IONS-SCNC: 6 MMOL/L (ref 4–13)
BUN SERPL-MCNC: 37 MG/DL (ref 5–25)
CALCIUM SERPL-MCNC: 8.6 MG/DL (ref 8.4–10.2)
CHLORIDE SERPL-SCNC: 95 MMOL/L (ref 96–108)
CO2 SERPL-SCNC: 28 MMOL/L (ref 21–32)
CREAT SERPL-MCNC: 1.12 MG/DL (ref 0.6–1.3)
GFR SERPL CREATININE-BSD FRML MDRD: 71 ML/MIN/1.73SQ M
GLUCOSE SERPL-MCNC: 139 MG/DL (ref 65–140)
POTASSIUM SERPL-SCNC: 4.3 MMOL/L (ref 3.5–5.3)
SODIUM SERPL-SCNC: 129 MMOL/L (ref 135–147)

## 2023-02-01 RX ORDER — ACETAMINOPHEN 500 MG
1000 TABLET ORAL EVERY 8 HOURS SCHEDULED
Qty: 100 TABLET | Refills: 0 | Status: SHIPPED | OUTPATIENT
Start: 2023-02-01

## 2023-02-01 RX ORDER — TAMSULOSIN HYDROCHLORIDE 0.4 MG/1
0.4 CAPSULE ORAL
Qty: 30 CAPSULE | Refills: 0 | Status: SHIPPED | OUTPATIENT
Start: 2023-02-01

## 2023-02-01 RX ADMIN — ACETAMINOPHEN 325MG 975 MG: 325 TABLET ORAL at 06:15

## 2023-02-01 RX ADMIN — DOCUSATE SODIUM 100 MG: 100 CAPSULE ORAL at 08:04

## 2023-02-01 RX ADMIN — SODIUM CHLORIDE, SODIUM LACTATE, POTASSIUM CHLORIDE, AND CALCIUM CHLORIDE 1000 ML: .6; .31; .03; .02 INJECTION, SOLUTION INTRAVENOUS at 01:01

## 2023-02-01 RX ADMIN — LISINOPRIL 5 MG: 5 TABLET ORAL at 08:04

## 2023-02-01 RX ADMIN — CHLORTHALIDONE 25 MG: 25 TABLET ORAL at 08:04

## 2023-02-01 RX ADMIN — AMLODIPINE BESYLATE 5 MG: 5 TABLET ORAL at 08:04

## 2023-02-01 RX ADMIN — OXYCODONE HYDROCHLORIDE 5 MG: 5 TABLET ORAL at 04:10

## 2023-02-01 RX ADMIN — PANTOPRAZOLE SODIUM 40 MG: 40 TABLET, DELAYED RELEASE ORAL at 08:04

## 2023-02-01 RX ADMIN — CEFAZOLIN SODIUM 2000 MG: 2 SOLUTION INTRAVENOUS at 06:12

## 2023-02-01 RX ADMIN — FERROUS SULFATE TAB 325 MG (65 MG ELEMENTAL FE) 325 MG: 325 (65 FE) TAB at 08:04

## 2023-02-01 RX ADMIN — Medication 12.5 MG: at 08:04

## 2023-02-01 RX ADMIN — OXYCODONE HYDROCHLORIDE AND ACETAMINOPHEN 500 MG: 500 TABLET ORAL at 08:03

## 2023-02-01 RX ADMIN — FOLIC ACID 1 MG: 1 TABLET ORAL at 08:03

## 2023-02-01 RX ADMIN — Medication 1 TABLET: at 08:04

## 2023-02-01 NOTE — NURSING NOTE
[facility]  Advised blood pressure 93/60 HR 85, recheck opposite arm 103/62 HR 83  (per hypotensive protocol bolus indicated if sbp < 100)per physician give  250ml bolus of NSS (patient asymptomatic) and  continue to monitor vital signs and follow hypotensive protocol

## 2023-02-01 NOTE — ASSESSMENT & PLAN NOTE
· Patient is s/p surgery today with Dr Dodie Mendoza  · Seen by PT/OT deemed appropriate for home with outpatient therapy    · Continue recommendations per primary team   · Postoperative DVT prophylaxis guided by orthopedics, aspirin  · Pain control

## 2023-02-01 NOTE — PROGRESS NOTES
Progress Note - Orthopedics   Alf Ocasio 59 y o  male MRN: [id number]  Unit/Bed#: -01 Encounter: [id number]    Assessment:  POD #1 right total knee; ambulatory dysfunction; hypertension    Plan:  Continue current treatment plan with PT and OT  Patient has been doing well and is likely to be discharged today pending final approval by physical therapy and clearance to return home  Discharge instructions were reviewed  He has scheduled follow-up with me in 2 weeks and should see his primary care physician in 1 to 2 weeks  Outpatient physical therapy is already scheduled to begin this week and a prescription will be placed in his chart  He was encouraged to contact me if questions or concerns arise  Discharge will be completed pending approval by physical therapy  Weight bearing: WBAT    VTE Pharmacologic Prophylaxis: Enoxaparin (Lovenox)  VTE Mechanical Prophylaxis: sequential compression device    Subjective: Tim reports mild pain right knee  He does note some lightheaded and dizziness when he first arises after sitting and getting out of bed  However, this quickly resolves and does not persist   He is tolerating physical therapy and ambulation  Stairclimbing is being assessed this morning  Vitals: Blood pressure 110/66, pulse 105, temperature 98 8 °F (37 1 °C), resp  rate 18, height 5' 6" (1 676 m), weight 79 4 kg (175 lb), SpO2 94 %  ,Body mass index is 28 25 kg/m²  Intake/Output Summary (Last 24 hours) at 2/1/2023 0938  Last data filed at 2/1/2023 4725  Gross per 24 hour   Intake 2530 ml   Output 2888 ml   Net -358 ml       Invasive Devices     Peripheral Intravenous Line  Duration           Peripheral IV Dorsal (posterior); Left Hand -- days                Physical Exam: The right lower extremity demonstrates the Ace bandage in place which was then removed  There is mild swelling in his calf consistent with his recent surgery  Mepilex dressing is clean, dry and intact    He can actively extend to approximately 20 degrees flexion position and flex to 80 degrees  I was able to passively extend him to about 10 degrees of flexion  Calf compartments are soft and nontender  Ressie Lava' sign is negative  He is actively moving his ankle and foot without difficulty  Sensation is intact  Pulses are palpable      Lab, Imaging and other studies:   CMP:   Lab Results   Component Value Date    SODIUM 129 (L) 02/01/2023    CL 95 (L) 02/01/2023    CO2 28 02/01/2023    BUN 37 (H) 02/01/2023    CREATININE 1 12 02/01/2023    CALCIUM 8 6 02/01/2023    EGFR 71 02/01/2023

## 2023-02-01 NOTE — NURSING NOTE
Patient given discharge instructions and verbalized understanding  Patient has all personal belongings  PIV removed  Patient awaiting his son to pick him up for discharge

## 2023-02-01 NOTE — PHYSICAL THERAPY NOTE
PHYSICAL THERAPY TREATMENT NOTE  NAME:  Acosta Garcia  DATE: 02/01/23    Length Of Stay: 0  Performed at least 2 patient identifiers during session: Name and ID wesley    TREATMENT FLOWSHEET:    02/01/23 0949   PT Last Visit   PT Visit Date 02/01/23   Note Type   Note Type Treatment   Pain Assessment   Pain Assessment Tool 0-10   Pain Score 2   Pain Location/Orientation Orientation: Right;Location: Knee   Pain Onset/Description Frequency: Intermittent; Descriptor: Aching   Patient's Stated Pain Goal No pain   Hospital Pain Intervention(s) Cold applied; Ambulation/increased activity;Repositioned   Restrictions/Precautions   Weight Bearing Precautions Per Order Yes   RLE Weight Bearing Per Order WBAT   Other Precautions Chair Alarm; Bed Alarm; Fall Risk;Pain   General   Chart Reviewed Yes   Response to Previous Treatment Patient with no complaints from previous session  Family/Caregiver Present No   Cognition   Overall Cognitive Status WFL   Arousal/Participation Alert; Cooperative   Attention Attends with cues to redirect   Orientation Level Oriented X4   Memory Within functional limits   Following Commands Follows one step commands without difficulty   Subjective   Subjective "I am suprised how good it feels "   Bed Mobility   Rolling R 6  Modified independent   Additional items HOB elevated; Increased time required   Rolling L 6  Modified independent   Additional items HOB elevated   Supine to Sit 6  Modified independent   Additional items HOB elevated; Increased time required   Additional Comments Pt  reported feeling light headed upon sitting upright  /64 and symptoms disapeared quickly  Pt  tolerated sitting at EOB to perform seated TE  Transfers   Sit to Stand 5  Supervision   Additional items Assist x 1; Armrests; Increased time required;Verbal cues  (RW)   Stand to Sit 5  Supervision   Additional items Assist x 1; Armrests; Increased time required;Verbal cues   Stand pivot 5  Supervision   Additional items Assist x 1; Armrests; Increased time required;Verbal cues  (RW)   Additional Comments VC's provided for proper hand placement during transitions  Pt  reported feeling light headed upon standing  BP decreased to 94/60 with symptoms disapearing quickly with sitting  STS attempted again with no further symptoms  RN aware   Ambulation/Elevation   Gait pattern Improper Weight shift;Decreased foot clearance;Decreased R stance; Step to;Excessively slow; Short stride;Decreased toe off;Decreased heel strike; Wide GO   Gait Assistance 5  Supervision   Additional items Assist x 1;Verbal cues   Assistive Device Rolling walker   Distance 10ft x2, 90ft, and 50ft   Stair Management Assistance 5  Supervision   Additional items Assist x 1;Verbal cues; Increased time required  (demonstration provided)   Stair Management Technique Two rails; Step to pattern; Foreward   Number of Stairs 16  (15 with B HR + 1 with RW performed B directions)   Ambulation/Elevation Additional Comments Pt  provided demonstration and VC's for  proper gait sequence for ambulation and for proper stair climbing technique  Pt  denied feeling lightheaded or dizzy during ambulation and stair training  Balance   Static Sitting Normal   Dynamic Sitting Good   Static Standing Fair +   Dynamic Standing Fair   Ambulatory Fair -   Endurance Deficit   Endurance Deficit Yes   Activity Tolerance   Activity Tolerance Patient limited by Ady Shankar and CM made aware Pt  is good for DC on PT stand point  PT made aware as well  Nurse Made Aware RN aware   Exercises   Quad Sets 15 reps;AROM; Supine;Right   Heelslides Supine;Sitting;15 reps;AROM; Right   Glute Sets Sitting;15 reps;AROM; Bilateral   Hip Flexion Sitting;15 reps;AROM; Right   Hip Abduction Sitting;15 reps;AROM; Bilateral   Hip Adduction Sitting;15 reps;AROM; Bilateral   Knee AROM Supine;Sitting;10 reps;AROM; Right   Knee AROM Short Arc Quad Supine;15 reps;AROM; Right   Knee PROM Flexion Supine;Sitting;15 reps;PROM; Right   Knee PROM Extension Supine;Sitting;15 reps;PROM; Right   Knee AROM Extension Supine Supine;Sitting;15 reps;Right;AROM   Knee Flexion Stretch Sitting;5 reps;PROM; Right  (held 15 seconds each)   Knee Extension Stretch Supine;5 reps;PROM; Right  (held 15 seconds each)   Knee AROM Long Arc Quad Sitting;15 reps;AROM; Right   Ankle Pumps Sitting;Supine;15 reps;AROM; Bilateral   Assessment   Prognosis Good   Problem List Decreased strength;Decreased range of motion;Decreased endurance; Impaired balance;Decreased mobility; Decreased coordination;Pain;Orthopedic restrictions;Decreased skin integrity   Goals   Patient Goals to go home   PT Treatment Day 2   Plan   Treatment/Interventions Functional transfer training;LE strengthening/ROM; Elevations; Therapeutic exercise; Endurance training;Patient/family training;Equipment eval/education; Bed mobility;Gait training; Compensatory technique education;Spoke to nursing;Spoke to MD;Spoke to case management   Progress Progressing toward goals   PT Frequency 5-7x/wk   Recommendation   PT Discharge Recommendation Home with outpatient rehabilitation   Additional Comments Pt  cleared for DC to home with out patient PT  AM-PAC Basic Mobility Inpatient   Turning in Flat Bed Without Bedrails 4   Lying on Back to Sitting on Edge of Flat Bed Without Bedrails 4   Moving Bed to Chair 3   Standing Up From Chair Using Arms 3   Walk in Room 3   Climb 3-5 Stairs With Railing 3   Basic Mobility Inpatient Raw Score 20   Basic Mobility Standardized Score 43 99   Highest Level Of Mobility   JH-HLM Goal 6: Walk 10 steps or more   JH-HLM Achieved 7: Walk 25 feet or more       The patient's AM-PAC Basic Mobility Inpatient Short Form Raw Score is 20, Standardized Score is 43 99  A standardized score greater than 42 9 suggests the patient may benefit from discharge to home   Please also refer to the recommendation of the Physical Therapist for safe discharge planning  Pt seen for PT treatment session this date with interventions consisting of bed mobility tasks, supine and seated TE, manual therapy performed to improve ROM R knee flexion and extension, transfer training, gait training, stair training, and education provided as needed for safety and direction to improve functional mobility, safety awareness, and activity tolerance  Pt agreeable to PT treatment session upon arrival, pt found resting in bed  At end of session, pt left sitting OOB in recliner with chair alarm activated, BLE elevated, SCD on LLE applied, ice applied, and with all needs in reach  In comparison to previous session, pt with improvements in ambulation distance and ability to perform stair training   Continue to recommend OP PT at time of d/c in order to maximize pt's functional independence and safety w/ mobility  Pt continues to be functioning below baseline level  PT will continue to see pt while here in order to address the deficits listed above and provide interventions consistent w/ POC in effort to achieve STGs      [place], [place]

## 2023-02-01 NOTE — PROGRESS NOTES
Dr Ree Beckford made aware while examining patient in patients room that patients BP dropped while standing in therapy and patient did feel dizzy  104/64 dropped to 94/60

## 2023-02-01 NOTE — CASE MANAGEMENT
Case Management Assessment & Discharge Planning Note    Patient name Rey Mccray  Location [address] [id number] MRN [id number]  : 1965 Date 2023       Current Admission Date: 2023  Current Admission Diagnosis:Primary osteoarthritis of right knee   Patient Active Problem List    Diagnosis Date Noted   • Primary osteoarthritis of right knee 2023   • Hypertension 2023   • History of prostate cancer 2023   • Chronic pain of both knees 2023      LOS (days): 0  Geometric Mean LOS (GMLOS) (days):   Days to GMLOS:     OBJECTIVE:              Current admission status: Outpatient Surgery  Referral Reason:  (Discharge planning)    Preferred Pharmacy:   [address] [place] [id number]  Phone: [phone] Fax: [phone]    Primary Care Provider: No primary care provider on file  Primary Insurance: [employer]  Secondary Insurance:     ASSESSMENT:  Active Health Care Proxies    There are no active Health Care Proxies on file  Advance Directives  Does patient have a [address]?: No  Was patient offered paperwork?: Yes (patient declined)  Does patient currently have a Health Care decision maker?: Yes, please see Health Care Proxy section  Does patient have Advance Directives?: No  Was patient offered paperwork?: Yes (patient declined)  Primary Contact: Pablomarti         Readmission Root Cause  30 Day Readmission: No    Patient Information  Admitted from[de-identified] Home  Mental Status: Alert  During Assessment patient was accompanied by: Not accompanied during assessment  Assessment information provided by[de-identified] Patient  Primary Caregiver: Self  Support Systems: [place] of Residence: [address] do you live in?: [facility] entry access options  Select all that apply : Stairs  Number of steps to enter home  : 1  Do the steps have railings?: No  Type of Current Residence: 2 story home  Upon entering residence, is there a bedroom on the main floor (no further steps)?: No  A bedroom is located on the following floor levels of residence (select all that apply):: 2nd Floor  Upon entering residence, is there a bathroom on the main floor (no further steps)?: Yes (1/2 bath)  Number of steps to 2nd floor from main floor: One Flight  In the last 12 months, how many places have you lived?: 1  In the last 12 months, was there a time when you did not have a steady place to sleep or slept in a shelter (including now)?: No  Homeless/housing insecurity resource given?: N/A  Living Arrangements: Lives w/ Friend    Activities of Daily Living Prior to Admission  Functional Status: Independent  Completes ADLs independently?: Yes  Ambulates independently?: Yes  Does patient use assisted devices?: No  Does patient currently own DME?: Yes  What DME does the patient currently own?: Dorina Eis  Does patient have a history of Outpatient Therapy (PT/OT)?: Yes ([facility])  Does the patient have a history of Short-Term Rehab?: No  Does patient have a history of HHC?: No  Does patient currently have [facility]?: No         Patient Information Continued  Income Source: Unemployed  Does patient have prescription coverage?: Yes  Within the past 12 months, you worried that your food would run out before you got the money to buy more : Never true  Within the past 12 months, the food you bought just didn't last and you didn't have money to get more : Never true  Food insecurity resource given?: N/A  Does patient receive dialysis treatments?: No  Does patient have a history of substance abuse?: No  Does patient have a history of Mental Health Diagnosis?: No         Means of Transportation  Means of Transport to Appts[de-identified] Drives Self  In the past 12 months, has lack of transportation kept you from medical appointments or from getting medications?: No  In the past 12 months, has lack of transportation kept you from meetings, work, or from getting things needed for daily living?: No  Was application for public transport provided?: N/A        DISCHARGE DETAILS:    Discharge planning discussed with[de-identified] patient  Freedom of Choice: Yes  Comments - Freedom of Choice: outpatient PT  CM contacted family/caregiver?: No- see comments (patient declined)  Were Treatment Team discharge recommendations reviewed with patient/caregiver?: Yes  Did patient/caregiver verbalize understanding of patient care needs?: Yes  Were patient/caregiver advised of the risks associated with not following Treatment Team discharge recommendations?: Yes         [address] [facility]         Is the patient interested in [place] [address] at discharge?: No    DME Referral Provided  Referral made for DME?: No    Other Referral/Resources/Interventions Provided:  Interventions: Outpatient PT    Would you like to participate in our [address] service program?  : No - Declined    Treatment Team Recommendation: Home  Discharge Destination Plan[de-identified] Home  Transport at Discharge : Family            CM met with patient at the bedside, baseline information was obtained  CM discussed the role of CM in helping the patient develop a discharge plan and assist the patient in carry out their plan  Patient lives in two story home, independent at baseline  CM discussed therapy recommendation for outpatient PT  Patient indicated prior outpatient PT at [facility] and hammad xie for outpatient at [address] following discharge from [facility]

## 2023-02-01 NOTE — PLAN OF CARE
Problem: MOBILITY - ADULT  Goal: Maintain or return to baseline ADL function  Description: INTERVENTIONS:  -  Assess patient's ability to carry out ADLs; assess patient's baseline for ADL function and identify physical deficits which impact ability to perform ADLs (bathing, care of mouth/teeth, toileting, grooming, dressing, etc )  - Assess/evaluate cause of self-care deficits   - Assess range of motion  - Assess patient's mobility; develop plan if impaired  - Assess patient's need for assistive devices and provide as appropriate  - Encourage maximum independence but intervene and supervise when necessary  - Involve family in performance of ADLs  - Assess for home care needs following discharge   - Consider OT consult to assist with ADL evaluation and planning for discharge  - Provide patient education as appropriate  2/1/2023 1117 by Maira De Los Santos RN  Outcome: Progressing  2/1/2023 1117 by Maira De Los Santos RN  Outcome: Adequate for Discharge  Goal: Maintains/Returns to pre admission functional level  Description: INTERVENTIONS:  - Perform BMAT or MOVE assessment daily    - Set and communicate daily mobility goal to care team and patient/family/caregiver  - Collaborate with rehabilitation services on mobility goals if consulted  - Perform Range of Motion 2 times a day  - Reposition patient every 2 hours    - Dangle patient 2 times a day  - Stand patient 2 times a day  - Ambulate patient 2 times a day  - Out of bed to chair 2 times a day   - Out of bed for meals 2 times a day  - Out of bed for toileting  - Record patient progress and toleration of activity level   2/1/2023 1117 by Maira De Los Santos RN  Outcome: Progressing  2/1/2023 1117 by Maira De Los Santos RN  Outcome: Adequate for Discharge     Problem: PAIN - ADULT  Goal: Verbalizes/displays adequate comfort level or baseline comfort level  Description: Interventions:  - Encourage patient to monitor pain and request assistance  - Assess pain using appropriate pain scale0-10  - Administer analgesics based on type and severity of pain and evaluate response  - Implement non-pharmacological measures as appropriate and evaluate response  - Consider cultural and social influences on pain and pain management  - Notify physician/advanced practitioner if interventions unsuccessful or patient reports new pain  2/1/2023 1117 by Lauren Yepez RN  Outcome: Progressing  2/1/2023 1117 by Lauren Yepez RN  Outcome: Adequate for Discharge     Problem: INFECTION - ADULT  Goal: Absence or prevention of progression during hospitalization  Description: INTERVENTIONS:  - Assess and monitor for signs and symptoms of infection  - Monitor lab/diagnostic results  - Monitor all insertion sites, i e  indwelling lines, tubes, and drains  - Monitor endotracheal if appropriate and nasal secretions for changes in amount and color  - [place] appropriate cooling/warming therapies per order  - Administer medications as ordered  - Instruct and encourage patient and family to use good hand hygiene technique  - Identify and instruct in appropriate isolation precautions for identified infection/condition  2/1/2023 1117 by Lauren Yepez RN  Outcome: Progressing  2/1/2023 1117 by Lauren Yepez RN  Outcome: Adequate for Discharge     Problem: SAFETY ADULT  Goal: Maintain or return to baseline ADL function  Description: INTERVENTIONS:  -  Assess patient's ability to carry out ADLs; assess patient's baseline for ADL function and identify physical deficits which impact ability to perform ADLs (bathing, care of mouth/teeth, toileting, grooming, dressing, etc )  - Assess/evaluate cause of self-care deficits   - Assess range of motion  - Assess patient's mobility; develop plan if impaired  - Assess patient's need for assistive devices and provide as appropriate  - Encourage maximum independence but intervene and supervise when necessary  - Involve family in performance of ADLs  - Assess for home care needs following discharge   - Consider OT consult to assist with ADL evaluation and planning for discharge  - Provide patient education as appropriate  2/1/2023 1117 by Clover Pagan RN  Outcome: Progressing  2/1/2023 1117 by Clover Pagan RN  Outcome: Adequate for Discharge  Goal: Maintains/Returns to pre admission functional level  Description: INTERVENTIONS:  - Perform BMAT or MOVE assessment daily    - Set and communicate daily mobility goal to care team and patient/family/caregiver  - Collaborate with rehabilitation services on mobility goals if consulted  - Perform Range of Motion 2 times a day  - Reposition patient every 2 hours    - Dangle patient 2 times a day  - Stand patient 2 times a day  - Ambulate patient 2 times a day  - Out of bed to chair 2 times a day   - Out of bed for meals 2 times a day  - Out of bed for toileting  - Record patient progress and toleration of activity level   2/1/2023 1117 by Clover Pagan RN  Outcome: Progressing  2/1/2023 1117 by Clover Pagan RN  Outcome: Adequate for Discharge  Goal: Patient will remain free of falls  Description: INTERVENTIONS:  - Educate patient/family on patient safety including physical limitations  - Instruct patient to call for assistance with activity   - Consult OT/PT to assist with strengthening/mobility   - Keep Call bell within reach  - Keep bed low and locked with side rails adjusted as appropriate  - Keep care items and personal belongings within reach  - Initiate and maintain comfort rounds  - Make Fall Risk Sign visible to staff  - Offer Toileting every 2 Hours, in advance of need  - Initiate/Maintain bed/chair alarm  - Obtain necessary fall risk management equipment:   - Apply yellow socks and bracelet for high fall risk patients  - Consider moving patient to room near nurses station  2/1/2023 1117 by Clover Pagan RN  Outcome: Progressing  2/1/2023 1117 by Clover Pagan RN  Outcome: Adequate for Discharge     Problem: DISCHARGE PLANNING  Goal: Discharge to home or other facility with appropriate resources  Description: INTERVENTIONS:  - Identify barriers to discharge w/patient and caregiver  - Arrange for needed discharge resources and transportation as appropriate  - Identify discharge learning needs (meds, wound care, etc )  - Arrange for interpretive services to assist at discharge as needed  - Refer to Case Management Department for coordinating discharge planning if the patient needs post-hospital services based on physician/advanced practitioner order or complex needs related to functional status, cognitive ability, or social support system  2/1/2023 1117 by Fozia Beltran RN  Outcome: Progressing  2/1/2023 1117 by Fozia Beltran RN  Outcome: Adequate for Discharge     Problem: Knowledge Deficit  Goal: Patient/family/caregiver demonstrates understanding of disease process, treatment plan, medications, and discharge instructions  Description: Complete learning assessment and assess knowledge base    Interventions:  - Provide teaching at level of understanding  - Provide teaching via preferred learning methods  2/1/2023 1117 by Fozia Beltran RN  Outcome: Progressing  2/1/2023 1117 by Fozia Beltran RN  Outcome: Adequate for Discharge

## 2023-02-01 NOTE — DISCHARGE SUMMARY
ORTHOPEDICS DISCHARGE SUMMARY          Procedure: Right total knee replacement    HPI:  This is a 59y o  year old male that presented to the office with signs and symptoms of right knee osteoarthritis  They tried and failed conservative treatment measures and wished to proceed with surgical intervention  The risks, benefits, and complications of the procedure were discussed with the patient and informed consent was obtained  Hospital Course: The patient was admitted to the hospital on 1/31/2023 and underwent an uncomplicated right total knee arthroplasty  They were transferred to the floor after a brief stay in the post-anesthesia care unit  Their pain was well managed with oral pain medications  They began therapy on post operative day #0  Lovenox 40 mg was also started for DVT prophylaxis 12 hours post operatively and aspirin 81 mg twice daily will be continued upon discharge  The patient was provided with a Harden catheter due to some urinary incontinence  Harden catheter was discontinued on postoperative day #1 and the patient was able to void without difficulty  On discharge date pt was cleared by PT and determined to be safe for discharge  Daily discussion was had with the patient, nursing staff, orthopaedic team, and family members if present  All questions were answered to the patients satisifaction  0   Lab Value Date/Time    HGB 15 0 01/12/2023 1118    HGB 7 1 (L) 09/26/2022 1922         Discharge Instructions: The patient was discharged weight bearing as tolerated to the right lower extremity  Aspirin 81 mg twice daily will be utilized for outpatient DVT prophylaxis  Continue PT/OT  Take pain medications as instructed  Discharge Medications: For the complete list of discharge medications, please refer to the patient's medication reconciliation

## 2023-02-01 NOTE — PLAN OF CARE
Problem: MOBILITY - ADULT  Goal: Maintain or return to baseline ADL function  Description: INTERVENTIONS:  -  Assess patient's ability to carry out ADLs; assess patient's baseline for ADL function and identify physical deficits which impact ability to perform ADLs (bathing, care of mouth/teeth, toileting, grooming, dressing, etc )  - Assess/evaluate cause of self-care deficits   - Assess range of motion  - Assess patient's mobility; develop plan if impaired  - Assess patient's need for assistive devices and provide as appropriate  - Encourage maximum independence but intervene and supervise when necessary  - Involve family in performance of ADLs  - Assess for home care needs following discharge   - Consider OT consult to assist with ADL evaluation and planning for discharge  - Provide patient education as appropriate  2/1/2023 1118 by Dinora Paul RN  Outcome: Adequate for Discharge  2/1/2023 1117 by Dinora Paul RN  Outcome: Progressing  2/1/2023 1117 by Dinora Paul RN  Outcome: Adequate for Discharge  Goal: Maintains/Returns to pre admission functional level  Description: INTERVENTIONS:  - Perform BMAT or MOVE assessment daily    - Set and communicate daily mobility goal to care team and patient/family/caregiver  - Collaborate with rehabilitation services on mobility goals if consulted  - Perform Range of Motion 2 times a day  - Reposition patient every 2 hours    - Dangle patient 2 times a day  - Stand patient 2 times a day  - Ambulate patient 2 times a day  - Out of bed to chair 2 times a day   - Out of bed for meals 2 times a day  - Out of bed for toileting  - Record patient progress and toleration of activity level   2/1/2023 1118 by Dinora Paul RN  Outcome: Adequate for Discharge  2/1/2023 1117 by Dinora Paul RN  Outcome: Progressing  2/1/2023 1117 by Dinora Paul RN  Outcome: Adequate for Discharge     Problem: PAIN - ADULT  Goal: Verbalizes/displays adequate comfort level or baseline comfort level  Description: Interventions:  - Encourage patient to monitor pain and request assistance  - Assess pain using appropriate pain scale0-10  - Administer analgesics based on type and severity of pain and evaluate response  - Implement non-pharmacological measures as appropriate and evaluate response  - Consider cultural and social influences on pain and pain management  - Notify physician/advanced practitioner if interventions unsuccessful or patient reports new pain  2/1/2023 1118 by Marquis Renetta RN  Outcome: Adequate for Discharge  2/1/2023 1117 by Marquis Renetta RN  Outcome: Progressing  2/1/2023 1117 by Marquis Renetta RN  Outcome: Adequate for Discharge     Problem: INFECTION - ADULT  Goal: Absence or prevention of progression during hospitalization  Description: INTERVENTIONS:  - Assess and monitor for signs and symptoms of infection  - Monitor lab/diagnostic results  - Monitor all insertion sites, i e  indwelling lines, tubes, and drains  - Monitor endotracheal if appropriate and nasal secretions for changes in amount and color  - [place] appropriate cooling/warming therapies per order  - Administer medications as ordered  - Instruct and encourage patient and family to use good hand hygiene technique  - Identify and instruct in appropriate isolation precautions for identified infection/condition  2/1/2023 1118 by Marquis Renetta RN  Outcome: Adequate for Discharge  2/1/2023 1117 by Marquis Renetta RN  Outcome: Progressing  2/1/2023 1117 by Marquis Renetta RN  Outcome: Adequate for Discharge     Problem: SAFETY ADULT  Goal: Maintain or return to baseline ADL function  Description: INTERVENTIONS:  -  Assess patient's ability to carry out ADLs; assess patient's baseline for ADL function and identify physical deficits which impact ability to perform ADLs (bathing, care of mouth/teeth, toileting, grooming, dressing, etc )  - Assess/evaluate cause of self-care deficits   - Assess range of motion  - Assess patient's mobility; develop plan if impaired  - Assess patient's need for assistive devices and provide as appropriate  - Encourage maximum independence but intervene and supervise when necessary  - Involve family in performance of ADLs  - Assess for home care needs following discharge   - Consider OT consult to assist with ADL evaluation and planning for discharge  - Provide patient education as appropriate  2/1/2023 1118 by Peet Porras RN  Outcome: Adequate for Discharge  2/1/2023 1117 by Pete Porras RN  Outcome: Progressing  2/1/2023 1117 by Pete Porras RN  Outcome: Adequate for Discharge  Goal: Maintains/Returns to pre admission functional level  Description: INTERVENTIONS:  - Perform BMAT or MOVE assessment daily    - Set and communicate daily mobility goal to care team and patient/family/caregiver  - Collaborate with rehabilitation services on mobility goals if consulted  - Perform Range of Motion 2 times a day  - Reposition patient every 2 hours    - Dangle patient 2 times a day  - Stand patient 2 times a day  - Ambulate patient 2 times a day  - Out of bed to chair 2 times a day   - Out of bed for meals 2 times a day  - Out of bed for toileting  - Record patient progress and toleration of activity level   2/1/2023 1118 by Pete Porras RN  Outcome: Adequate for Discharge  2/1/2023 1117 by Pete Porras RN  Outcome: Progressing  2/1/2023 1117 by Pete Porras RN  Outcome: Adequate for Discharge  Goal: Patient will remain free of falls  Description: INTERVENTIONS:  - Educate patient/family on patient safety including physical limitations  - Instruct patient to call for assistance with activity   - Consult OT/PT to assist with strengthening/mobility   - Keep Call bell within reach  - Keep bed low and locked with side rails adjusted as appropriate  - Keep care items and personal belongings within reach  - Initiate and maintain comfort rounds  - Make Fall Risk Sign visible to staff  - Offer Toileting every 2 Hours, in advance of need  - Initiate/Maintain bed/chair alarm  - Obtain necessary fall risk management equipment:   - Apply yellow socks and bracelet for high fall risk patients  - Consider moving patient to room near nurses station  2/1/2023 1118 by Chad Layton RN  Outcome: Adequate for Discharge  2/1/2023 1117 by Chad Layton RN  Outcome: Progressing  2/1/2023 1117 by Chad Layton RN  Outcome: Adequate for Discharge     Problem: DISCHARGE PLANNING  Goal: Discharge to home or other facility with appropriate resources  Description: INTERVENTIONS:  - Identify barriers to discharge w/patient and caregiver  - Arrange for needed discharge resources and transportation as appropriate  - Identify discharge learning needs (meds, wound care, etc )  - Arrange for interpretive services to assist at discharge as needed  - Refer to Case Management Department for coordinating discharge planning if the patient needs post-hospital services based on physician/advanced practitioner order or complex needs related to functional status, cognitive ability, or social support system  2/1/2023 1118 by Chad Layton RN  Outcome: Adequate for Discharge  2/1/2023 1117 by Chad Layton RN  Outcome: Progressing  2/1/2023 1117 by Chad Layton RN  Outcome: Adequate for Discharge     Problem: Knowledge Deficit  Goal: Patient/family/caregiver demonstrates understanding of disease process, treatment plan, medications, and discharge instructions  Description: Complete learning assessment and assess knowledge base    Interventions:  - Provide teaching at level of understanding  - Provide teaching via preferred learning methods  2/1/2023 1118 by Chad Layton RN  Outcome: Adequate for Discharge  2/1/2023 1117 by Chad Layton RN  Outcome: Progressing  2/1/2023 1117 by Chad Layton RN  Outcome: Adequate for Discharge

## 2023-02-01 NOTE — DISCHARGE INSTR - AVS FIRST PAGE
Mepilex dressing may be removed in 1 week but should remain in place until then  He may begin to shower on 2/3/2023  After removal of the Mepilex dressing, the incision may be gently cleansed but do not submerge the incision  Begin physical therapy as scheduled  Take aspirin 81 mg twice daily until follow-up in 2 weeks  Resume your normal medications  Continue taking the perioperative medications as prescribed prior to surgery  Contact my office if any questions or concerns arise

## 2023-02-01 NOTE — ASSESSMENT & PLAN NOTE
· Follows with urology, last appointment was this month  · PSA undetectable, continue outpatient follow-up  · Reports not taking Flomax however continue while hospitalized secondary to urinary retention with surgery  · Harden removed postop day 1 this morning  · Continue urinary retention protocol, if voiding well today is okay for discharge    Continue follow-up with urology as scheduled as an outpatient  Continue Flomax on discharge

## 2023-02-01 NOTE — PLAN OF CARE
Problem: PHYSICAL THERAPY ADULT  Goal: Performs mobility at highest level of function for planned discharge setting  See evaluation for individualized goals  Description: Treatment/Interventions: ADL retraining, Functional transfer training, LE strengthening/ROM, Elevations, Therapeutic exercise, Endurance training, Patient/family training, Equipment eval/education, Bed mobility, Gait training, Compensatory technique education, Spoke to nursing, Spoke to case management, OT  Equipment Recommended:  (walker-pt has)       See flowsheet documentation for full assessment, interventions and recommendations  Outcome: Progressing  Note: Prognosis: Good  Problem List: Decreased strength, Decreased range of motion, Decreased endurance, Impaired balance, Decreased mobility, Decreased coordination, Pain, Orthopedic restrictions, Decreased skin integrity  Assessment: Pt seen for PT treatment session this date with interventions consisting of bed mobility tasks, supine and seated TE, manual therapy performed to improve ROM R knee flexion and extension, transfer training, gait training, stair training, and education provided as needed for safety and direction to improve functional mobility, safety awareness, and activity tolerance  Pt agreeable to PT treatment session upon arrival, pt found resting in bed  At end of session, pt left sitting OOB in recliner with chair alarm activated, BLE elevated, SCD on LLE applied, ice applied, and with all needs in reach  In comparison to previous session, pt with improvements in ambulation distance and ability to perform stair training   Continue to recommend OP PT at time of d/c in order to maximize pt's functional independence and safety w/ mobility  Pt continues to be functioning below baseline level  PT will continue to see pt while here in order to address the deficits listed above and provide interventions consistent w/ POC in effort to achieve STGs    Barriers to Discharge: None  Barriers to Discharge Comments: pt has support from friend if needed  PT Discharge Recommendation: Home with outpatient rehabilitation    See flowsheet documentation for full assessment

## 2023-02-01 NOTE — PROGRESS NOTES
[address]  Progress Note - Kirsten Pride 1965, 59 y o  male MRN: [id number]  Unit/Bed#: -01 Encounter: [id number]  Primary Care Provider: No primary care provider on file  Date and time admitted to hospital: 1/31/2023  5:54 AM    * Primary osteoarthritis of right knee  Assessment & Plan  · Patient is s/p surgery today with Dr Mary Kate Kwok  · Seen by PT/OT deemed appropriate for home with outpatient therapy    · Continue recommendations per primary team   · Postoperative DVT prophylaxis guided by orthopedics, aspirin  · Pain control    Hypertension  Assessment & Plan  · Home medications include amlodipine, chlorthalidone, lisinopril, metoprolol  · Soft blood pressure postop  · Monitor, likely continue medications in AM     History of prostate cancer  Assessment & Plan  · Follows with urology, last appointment was this month  · PSA undetectable, continue outpatient follow-up  · Reports not taking Flomax however continue while hospitalized secondary to urinary retention with surgery  · Harden removed postop day 1 this morning  · Continue urinary retention protocol, if voiding well today is okay for discharge    Continue follow-up with urology as scheduled as an outpatient  Continue Flomax on discharge        VTE Pharmacologic Prophylaxis: VTE Score: 1 Moderate Risk (Score 3-4) - Pharmacological DVT Prophylaxis Ordered: enoxaparin (Lovenox)  Patient Centered Rounds: I performed bedside rounds with nursing staff today  Discussions with Specialists or Other Care Team Provider: DANNI    Education and Discussions with Family / Patient: Patient declined call to   Time Spent for Care: 30 minutes  More than 50% of total time spent on counseling and coordination of care as described above      Current Length of Stay: 0 day(s)  Current Patient Status: Outpatient Surgery   Certification Statement: The patient will continue to require additional inpatient hospital stay due to orthopedic surgery post-op   Discharge Plan: [facility] is following this patient on consult  They are medically stable for discharge when deemed appropriate by primary service  Code Status: Level 1 - Full Code    Subjective:   Seen and examined  Denies any acute concerns, no pain currently working with PT on exam   Does not feel he is having trouble voiding however has not tried since removal of Harden  Discussed with him urinary retention protocol patient agreeable  Objective:     Vitals:   Temp (24hrs), Av 1 °F (36 7 °C), Min:97 5 °F (36 4 °C), Max:99 °F (37 2 °C)    Temp:  [97 5 °F (36 4 °C)-99 °F (37 2 °C)] 98 8 °F (37 1 °C)  HR:  [] 105  Resp:  [14-22] 18  BP: ()/(54-80) 110/66  SpO2:  [94 %-100 %] 94 %  Body mass index is 28 25 kg/m²  Input and Output Summary (last 24 hours): Intake/Output Summary (Last 24 hours) at 2023 1010  Last data filed at 2023 1029  Gross per 24 hour   Intake 1910 ml   Output 2663 ml   Net -753 ml       Physical Exam:   Physical Exam  Vitals and nursing note reviewed  Constitutional:       General: He is not in acute distress  Appearance: Normal appearance  HENT:      Head: Normocephalic and atraumatic  Nose: No congestion  Mouth/Throat:      Mouth: Mucous membranes are moist    Eyes:      Conjunctiva/sclera: Conjunctivae normal    Cardiovascular:      Rate and Rhythm: Normal rate and regular rhythm  Pulses: Normal pulses  Heart sounds: Normal heart sounds  No murmur heard  Pulmonary:      Effort: Pulmonary effort is normal  No respiratory distress  Breath sounds: Normal breath sounds  Abdominal:      General: Bowel sounds are normal       Palpations: Abdomen is soft  Tenderness: There is no abdominal tenderness  Musculoskeletal:         General: Normal range of motion  Right lower leg: No edema  Left lower leg: No edema        Comments: Right knee with ACE wrap   Skin:     General: Skin is warm and dry  Neurological:      Mental Status: He is alert and oriented to person, place, and time  Additional Data:     Labs:      Results from last 7 days   Lab Units 02/01/23  0017   SODIUM mmol/L 129*   POTASSIUM mmol/L 4 3   CHLORIDE mmol/L 95*   CO2 mmol/L 28   BUN mg/dL 37*   CREATININE mg/dL 1 12   ANION GAP mmol/L 6   CALCIUM mg/dL 8 6   GLUCOSE RANDOM mg/dL 139         Results from last 7 days   Lab Units 01/31/23  1143 01/31/23  1102   POC GLUCOSE mg/dl 92 91               Lines/Drains:  Invasive Devices     Peripheral Intravenous Line  Duration           Peripheral IV Dorsal (posterior); Left Hand -- days                      Imaging: No pertinent imaging reviewed      Recent Cultures (last 7 days):         Last 24 Hours Medication List:   Current Facility-Administered Medications   Medication Dose Route Frequency Provider Last Rate   • acetaminophen  975 mg Oral Q8H [facility] Diverio     • aluminum-magnesium hydroxide-simethicone  30 mL Oral Q6H PRN Gerson Diverio     • amLODIPine  5 mg Oral Daily Gerson Diverio     • ascorbic acid  500 mg Oral BID Gerson Diverio     • bosentan  125 mg Oral BID Gerson Diverio     • calcium carbonate  1,000 mg Oral Daily PRN [facility] Diverio     • chlorthalidone  25 mg Oral Daily Gerson Diverio     • docusate sodium  100 mg Oral BID Gerson Diverio     • enoxaparin  40 mg Subcutaneous Q24H [facility] Diverio     • ferrous sulfate  325 mg Oral BID With Meals Gerson Diverio     • folic acid  1 mg Oral Daily Gerson Diverio     • lactated ringers  1,000 mL Intravenous Once PRN [facility] Diverio      And   • lactated ringers  1,000 mL Intravenous Once PRN Gerson Diverio Stopped (02/01/23 4417)   • lactated ringers  125 mL/hr Intravenous Continuous Gerson Diverio 125 mL/hr (01/31/23 0735)   • lactated ringers  125 mL/hr Intravenous Continuous Gerson Diverio Stopped (01/31/23 1320)   • lisinopril  5 mg Oral Daily Gerson Diverio     • metoprolol tartrate  12 5 mg Oral Q12H [facility] Gypsy David     • multivitamin-minerals  1 tablet Oral Daily Corrinne Marion Diverio     • oxyCODONE  10 mg Oral Q6H PRN Corrinne Marion Diverio     • oxyCODONE  5 mg Oral Q6H PRN Corrinne Marion Diverio     • pantoprazole  40 mg Oral Daily Gerson Diverio     • sodium chloride  1,000 mL Intravenous Once PRN Corrinne Marion Diverio      And   • sodium chloride  1,000 mL Intravenous Once PRN Gypsy David     • tamsulosin  0 4 mg Oral Daily With [address]          Today, Patient Was Seen By: Ej Smith PA-C    **Please Note: This note may have been constructed using a voice recognition system  **

## 2023-02-02 ENCOUNTER — TELEPHONE (OUTPATIENT)
Dept: OBGYN CLINIC | Facility: HOSPITAL | Age: 58
End: 2023-02-02

## 2023-02-02 NOTE — TELEPHONE ENCOUNTER
Pt returned my call, full postoperative follow up call assessment completed  He reports having "Pain" local to the knee, that he rates at a constant 7-8/10  He is ambulating with the RW, and reports he is icing, and swelling remains the same  He reports his dressing is dry with no drainage  He has outpatient PT tomorrow at  [facility]    We reviewed his AVS medication list  He is taking  Tylenol 1000mg TID - no other scripts prescribed per AVS and patient  I will clarify with surgeon  He is taking the   ASA 81mg BID listed in surgical note  Pt denies chest pain, SOB, dizziness, fever or calf pain  He denies questions, just reports struggling with pain at this time

## 2023-02-03 DIAGNOSIS — M17.11 PRIMARY OSTEOARTHRITIS OF RIGHT KNEE: Primary | ICD-10-CM

## 2023-02-03 RX ORDER — OXYCODONE HYDROCHLORIDE 5 MG/1
TABLET ORAL
Qty: 30 TABLET | Refills: 0 | Status: SHIPPED | OUTPATIENT
Start: 2023-02-03

## 2023-02-13 ENCOUNTER — OFFICE VISIT (OUTPATIENT)
Dept: OBGYN CLINIC | Facility: CLINIC | Age: 58
End: 2023-02-13

## 2023-02-13 ENCOUNTER — HOSPITAL ENCOUNTER (OUTPATIENT)
Dept: RADIOLOGY | Facility: CLINIC | Age: 58
Discharge: HOME | End: 2023-02-13

## 2023-02-13 VITALS
HEIGHT: 66 IN | DIASTOLIC BLOOD PRESSURE: 92 MMHG | HEART RATE: 100 BPM | WEIGHT: 176 LBS | SYSTOLIC BLOOD PRESSURE: 140 MMHG | BODY MASS INDEX: 28.28 KG/M2 | TEMPERATURE: 98.3 F

## 2023-02-13 DIAGNOSIS — Z96.651 STATUS POST TOTAL KNEE REPLACEMENT, RIGHT: ICD-10-CM

## 2023-02-13 DIAGNOSIS — Z96.651 PRESENCE OF ARTIFICIAL KNEE JOINT, RIGHT: ICD-10-CM

## 2023-02-13 DIAGNOSIS — Z96.651 PRESENCE OF ARTIFICIAL KNEE JOINT, RIGHT: Primary | ICD-10-CM

## 2023-02-13 DIAGNOSIS — Z96.651 AFTERCARE FOLLOWING RIGHT KNEE JOINT REPLACEMENT SURGERY: ICD-10-CM

## 2023-02-13 DIAGNOSIS — Z47.1 AFTERCARE FOLLOWING RIGHT KNEE JOINT REPLACEMENT SURGERY: ICD-10-CM

## 2023-02-13 PROBLEM — M17.11 PRIMARY OSTEOARTHRITIS OF RIGHT KNEE: Status: RESOLVED | Noted: 2023-01-31 | Resolved: 2023-02-13

## 2023-02-13 NOTE — PATIENT INSTRUCTIONS
He will return in 4 weeks for routine follow-up  Continue physical therapy 3 times per week  Continue aspirin twice a day for anticoagulation for another 3 to 4 weeks  Work on extension range of motion primarily and secondary motion being flexion  Ice 20- 30 minutes every 2 to 3 hours as needed for discomfort  Continue elevation

## 2023-02-13 NOTE — PROGRESS NOTES
Patient Name:  Pete Friend  MRN:  [id number]    Assessment     1  Presence of artificial knee joint, right  XR knee 3 vw right non injury      2  Aftercare following right knee joint replacement surgery        3  Status post total knee replacement, right            Plan     1  He will return in 4 weeks for routine follow-up  Continue physical therapy 3 times per week  Continue aspirin twice a day for anticoagulation for another 3 to 4 weeks  Work on extension range of motion primarily and secondary motion being flexion  Ice 20- 30 minutes every 2 to 3 hours as needed for discomfort  Continue elevation  Subjective   Pete Friend returns for follow-up of right total knee arthroplasty performed 1/31/2023  The patient is 13 day(s) post op and returns for routine follow-up  Patient complains of moderate pain with AM stiffness as expected postoperatively  Denies any numbness or tingling  No nausea  Continues on his baby aspirin twice a day  He is doing physical therapy in [facility] 3 times a week  Objective     /92 (BP Location: Left arm)   Pulse 100   Temp 98 3 °F (36 8 °C) (Temporal)   Ht 5' 6" (1 676 m)   Wt 79 8 kg (176 lb)   BMI 28 41 kg/m²     Recent swelling and resolving ecchymosis about the right knee  Incision is E[place] well without the fall  Very strips were applied for secondary reinforcement most of the glue has come off  There is no calf pain  No palpable cords  Negative Homans' sign  Thigh is soft nontender proximally with comfort near the knee joint  Range of motion is 15 degrees to 90 degrees passively  He has good plantar and dorsiflexion of the ankle  Active knee extension is intact, but limited  Data Review     Personally viewed x-rays of the right knee taken the office today which note vented total knee in satisfactory position alignment      Kiki Crabtree PA-C

## 2023-03-13 ENCOUNTER — OFFICE VISIT (OUTPATIENT)
Dept: OBGYN CLINIC | Facility: CLINIC | Age: 58
End: 2023-03-13

## 2023-03-13 VITALS
SYSTOLIC BLOOD PRESSURE: 140 MMHG | HEART RATE: 97 BPM | HEIGHT: 66 IN | WEIGHT: 176 LBS | TEMPERATURE: 98.4 F | DIASTOLIC BLOOD PRESSURE: 80 MMHG | BODY MASS INDEX: 28.28 KG/M2

## 2023-03-13 DIAGNOSIS — Z96.651 AFTERCARE FOLLOWING RIGHT KNEE JOINT REPLACEMENT SURGERY: Primary | ICD-10-CM

## 2023-03-13 DIAGNOSIS — Z47.1 AFTERCARE FOLLOWING RIGHT KNEE JOINT REPLACEMENT SURGERY: Primary | ICD-10-CM

## 2023-03-13 DIAGNOSIS — Z96.651 PRESENCE OF ARTIFICIAL KNEE JOINT, RIGHT: ICD-10-CM

## 2023-03-13 NOTE — PROGRESS NOTES
Patient Name:  Shad Bruno  MRN:  [id number]    Assessment     1  Aftercare following right knee joint replacement surgery        2  Presence of artificial knee joint, right            Plan     1  I would recommend follow-up in 4 to 6 weeks  He is to continue in physical therapy  I did discuss the option of manipulation under anesthesia but he does not want to pursue that at this time  He is to contact me if questions or concerns arise  Return 4 to 6 weeks  Subjective   Shad Bruno returns for follow-up of his right total knee  The patient is 6 week(s) post surgery and returns for routine follow-up  Patient complains of some continued pain  He notes improvement from his last visit but continues to experience daily pain  Physical therapy is working with him and he continues to attend routinely  He denies lower extremity paresthesias  Objective     /80   Pulse 97   Temp 98 4 °F (36 9 °C) (Temporal)   Ht 5' 6" (1 676 m)   Wt 79 8 kg (176 lb)   BMI 28 41 kg/m²     Exam today demonstrates Tim to ambulate with a cane  The right knee demonstrates a well-healed incision  The skin is warm and dry and there is no significant effusion noted  Range of motion is limited from about 10 degrees to 90 degrees  Ligaments are stable  Calf compartments are soft and nontender  Data Review     The x-rays of his knee from his postop visit of 4 weeks ago were reviewed again  The prosthesis is well-seated and demonstrates overall good alignment      Chiquis Elieser

## 2023-04-12 PROBLEM — M24.661 ARTHROFIBROSIS OF KNEE JOINT, RIGHT: Status: ACTIVE | Noted: 2023-04-12

## 2023-04-27 ENCOUNTER — OFFICE VISIT (OUTPATIENT)
Dept: OBGYN CLINIC | Facility: CLINIC | Age: 58
End: 2023-04-27

## 2023-04-27 VITALS
DIASTOLIC BLOOD PRESSURE: 80 MMHG | SYSTOLIC BLOOD PRESSURE: 120 MMHG | WEIGHT: 176 LBS | HEIGHT: 66 IN | TEMPERATURE: 97.6 F | HEART RATE: 81 BPM | BODY MASS INDEX: 28.28 KG/M2

## 2023-04-27 DIAGNOSIS — Z96.651 AFTERCARE FOLLOWING RIGHT KNEE JOINT REPLACEMENT SURGERY: Primary | ICD-10-CM

## 2023-04-27 DIAGNOSIS — M24.661 ARTHROFIBROSIS OF KNEE JOINT, RIGHT: ICD-10-CM

## 2023-04-27 DIAGNOSIS — Z47.1 AFTERCARE FOLLOWING RIGHT KNEE JOINT REPLACEMENT SURGERY: Primary | ICD-10-CM

## 2023-04-27 DIAGNOSIS — Z96.651 PRESENCE OF ARTIFICIAL KNEE JOINT, RIGHT: ICD-10-CM

## 2023-04-27 NOTE — PROGRESS NOTES
"Patient Name:  Rashaun Robert  MRN:  [id number]    Assessment     1  Aftercare following right knee joint replacement surgery        2  Arthrofibrosis of knee joint, right        3  Presence of artificial knee joint, right            Plan     1  I would recommend continuing physical therapy 5 times a week for a complete 10 course program and then transitioning to therapy 2-3 times a week  He did question me regarding the use of a Dynasplint or some kind of bracing system to work on extension  I think this is a very reasonable option and had consider discussing this with him  An order will be placed  He is to discuss this with his physical therapist as well and if they are able to get the brace for him, I will be happy to forward the prescription to them  I will see him in 2 weeks for reevaluation  He is to contact me if questions or concerns arise  Return in about 2 weeks (around 5/11/2023)  Subjective   Rashaun Robert returns for follow-up of his right knee  The patient is 1 week(s) post manipulation of right total knee replacement under anesthesia and returns for routine follow-up  Patient denies pain  He does believe that his motion is improved somewhat  He is attending physical therapy 5 times weekly as recommended  He did question me as to whether or not a brace might be helpful to assist with obtaining better extension  Objective     /80 (BP Location: Left arm)   Pulse 81   Temp 97 6 °F (36 4 °C) (Temporal)   Ht 5' 6\" (1 676 m)   Wt 79 8 kg (176 lb)   BMI 28 41 kg/m²     The right knee exam demonstrates the surgical incision to be well-healed and benign  There is no erythema or rubor  I am able to obtain passive extension to approximately -5 to 10 degrees and flexion to 100 degrees  He has no localized tenderness  Calf compartments are soft and nontender  He ambulates without assistive devices  Data Review     Physical therapy notes were reviewed      Shane Navarro " Tana Bridges

## 2023-05-11 ENCOUNTER — OFFICE VISIT (OUTPATIENT)
Dept: OBGYN CLINIC | Facility: CLINIC | Age: 58
End: 2023-05-11

## 2023-05-11 VITALS
SYSTOLIC BLOOD PRESSURE: 136 MMHG | DIASTOLIC BLOOD PRESSURE: 74 MMHG | HEART RATE: 63 BPM | HEIGHT: 66 IN | BODY MASS INDEX: 28.28 KG/M2 | TEMPERATURE: 96.9 F | WEIGHT: 176 LBS

## 2023-05-11 DIAGNOSIS — M24.661 ARTHROFIBROSIS OF KNEE JOINT, RIGHT: Primary | ICD-10-CM

## 2023-05-11 DIAGNOSIS — Z96.651 PRESENCE OF ARTIFICIAL KNEE JOINT, RIGHT: ICD-10-CM

## 2023-05-11 NOTE — PROGRESS NOTES
ASSESSMENT/PLAN:    Diagnoses and all orders for this visit:    Arthrofibrosis of knee joint, right  -     Ambulatory Referral to Physical Therapy; Future    Presence of artificial knee joint, right  -     Ambulatory Referral to Physical Therapy; Future        Plan: I would recommend continuing physical therapy and a new prescription was provided  I will see him in 2 weeks for reevaluation  He is to contact the office if questions or concerns arise  Return in about 2 weeks (around 5/25/2023)  _____________________________________________________  CHIEF COMPLAINT:  Chief Complaint   Patient presents with   • Right Knee - Post-op         SUBJECTIVE:  Kendall Kearney is a 59y o  year old male who presents for follow up of his right knee status post manipulation under anesthesia secondary to post knee replacement arthrofibrosis  He notes improvement in his range of motion in terms of flexion  He does not think there has been significant change in extension  He has now able to climb stairs reciprocally  Descending stairs he comes down 1 at a time  He is continuing to attend physical therapy and indicates he needs a new prescription      PAST MEDICAL HISTORY:  Past Medical History:   Diagnosis Date   • Anemia    • Arthritis     Pt reports in knees and left hip   • Cancer ([facility] )     lymphoma in 2018- had chemotherapy--4/14/23 NO FURTHER LYMPHPHOMA   • Colon polyp    • COVID-19 2021   • Diabetes mellitus ([facility] )     4/14/23 per pt Dr Mallorie June states with Type 2 DM -not on any medications at this time per pt   • Fatty liver    • Hypertension    • Kidney stone     4/14/23 Passed stones independently - no surgery needed per pt   • Osteoarthritis    • Prostate cancer ([facility] )        PAST SURGICAL HISTORY:  Past Surgical History:   Procedure Laterality Date   • COLONOSCOPY     • HIP SURGERY Left     pt states he had 3 surgeries on left hip   • IR PORT REMOVAL     • JOINT REPLACEMENT     • [place] ARTHRP KNE CONDYLE&PLATU MEDIAL&LAT COMPARTMENTS Right 01/31/2023    Procedure: ARTHROPLASTY KNEE TOTAL;  Surgeon: Lonna Hashimoto; [facility]: [facility];  Service: Orthopedics   • [place] MANIPULATION KNEE JOINT UNDER GENERAL ANESTHESIA Right 4/20/2023    Procedure: MANIPULATION JOINT KNEE;  Surgeon: Lonna Hashimoto; Location: [facility];  Service: Orthopedics   • PROSTATECTOMY     • REMOVAL VENOUS PORT (PORT-A-CATH)         FAMILY HISTORY:  Family History   Problem Relation Age of Onset   • No Known Problems Mother    • No Known Problems Father    • Anesthesia problems Neg Hx        SOCIAL HISTORY:  Social History     Tobacco Use   • Smoking status: Former     Packs/day: 1 00     Years: 10 00     Pack years: 10 00     Types: Cigarettes   • Smokeless tobacco: Never   • Tobacco comments:     Pt quit in the 1990's   Vaping Use   • Vaping Use: Never used   Substance Use Topics   • Alcohol use: Not Currently     Comment: No alcohol use - former use, no problems with former use   • Drug use: Never     Comment: Denies any drug use       MEDICATIONS:    Current Outpatient Medications:   •  acetaminophen (TYLENOL) 500 mg tablet, Take 2 tablets (1,000 mg total) by mouth every 8 (eight) hours, Disp: 100 tablet, Rfl: 0  •  amLODIPine (NORVASC) 5 mg tablet, Take 5 mg by mouth daily Takes in the am, Disp: , Rfl:   •  chlorthalidone 25 mg tablet, Take 25 mg by mouth daily 4/14/23 Per pt do take this medication however ran out and has not taken for approx 2 weeks   Instructed pt to call DR Chacho Spears: , Rfl:   •  lisinopril (ZESTRIL) 5 mg tablet, Take 5 mg by mouth daily, Disp: , Rfl:   •  metoprolol tartrate (LOPRESSOR) 25 mg tablet, Take 12 5 mg by mouth every 12 (twelve) hours, Disp: , Rfl:   •  ferrous sulfate 324 (65 Fe) mg, Take 1 tablet (324 mg total) by mouth 2 (two) times a day before meals (Patient not taking: Reported on 4/27/2023), Disp: 120 tablet, Rfl: 0  •  folic acid (FOLVITE) 1 mg tablet, Take 1 tablet (1 mg total) by mouth daily (Patient not taking: Reported on 4/27/2023), Disp: 60 tablet, Rfl: 0    ALLERGIES:  No Known Allergies    REVIEW OF SYSTEMS:  Pertinent items are noted in HPI  A comprehensive review of systems was negative       _____________________________________________________  PHYSICAL EXAMINATION:  General: well developed and well nourished, alert and appears comfortable ambulating with somewhat of a limp, maintain the knee in about 10 degrees of flexion during stance and not flexing the knee more than about 20 degrees while ambulating  Psychiatric: Normal  HEENT:  Normocephalic, atraumatic  Cardiovascular:  Regular  Pulmonary: No wheezing or stridor  Skin: No masses, erthema, lacerations, fluctation, ulcerations  Neurovascular: There are and sensory exams are intact and pulses palpable  MUSCULOSKELETAL EXAMINATION:    Right knee exam demonstrates a well-healed incision  There is some residual swelling consistent with his surgical history  He is able to flex the knee to about 100 degrees  He lacks about 5 degrees of terminal extension  Ligaments are grossly stable and he has no localized tenderness            Cristal Welch

## 2023-05-15 ENCOUNTER — TELEPHONE (OUTPATIENT)
Dept: OBGYN CLINIC | Facility: CLINIC | Age: 58
End: 2023-05-15

## 2023-05-15 NOTE — TELEPHONE ENCOUNTER
Caller: Tracy head/ [facility]     Doctor: Marlon Abbott     Reason for call: Please fax the plan of care that is signed back to them   It is scanned in on 5/8/23        Fax#: [phone]     Call back#: [phone]

## 2023-05-17 ENCOUNTER — TELEPHONE (OUTPATIENT)
Dept: OBGYN CLINIC | Facility: HOSPITAL | Age: 58
End: 2023-05-17

## 2023-05-17 NOTE — TELEPHONE ENCOUNTER
Caller: Pretty Hernandez    Doctor: Damaris Al    Reason for call: called to see if we had another phone number for the patient, patients splint is in and they need to contact the patient for       Call back#: [phone]

## 2023-05-17 NOTE — TELEPHONE ENCOUNTER
Dr Sudheer Sanchez returned from vacation and signed  plan of care 5/8/23  papers faxed back to [phone]

## 2023-05-25 ENCOUNTER — OFFICE VISIT (OUTPATIENT)
Dept: OBGYN CLINIC | Facility: CLINIC | Age: 58
End: 2023-05-25

## 2023-05-25 VITALS
WEIGHT: 176 LBS | SYSTOLIC BLOOD PRESSURE: 110 MMHG | BODY MASS INDEX: 28.28 KG/M2 | HEIGHT: 66 IN | HEART RATE: 74 BPM | DIASTOLIC BLOOD PRESSURE: 72 MMHG | TEMPERATURE: 97.6 F

## 2023-05-25 DIAGNOSIS — Z96.651 PRESENCE OF ARTIFICIAL KNEE JOINT, RIGHT: Primary | ICD-10-CM

## 2023-05-25 DIAGNOSIS — M24.661 ARTHROFIBROSIS OF KNEE JOINT, RIGHT: ICD-10-CM

## 2023-05-25 NOTE — PROGRESS NOTES
"Patient Name:  Katrin Pastrana  MRN:  [id number]    Assessment     1  Presence of artificial knee joint, right        2  Arthrofibrosis of knee joint, right            Plan     1  I would recommend follow-up in about 8 months when he is 1 year post knee replacement  AP, lateral and sunrise images of the knee would be obtained at that time  In the interim, he is to continue with his home exercise program   He is to contact me if questions or concerns arise or if he feels more urgent reevaluation is necessary  Return in about 8 months (around 1/25/2024)  Subjective   Katrin Pastrana returns for follow-up of right TKA performed 1/31/2023, with manipulation under anesthesia performed 4/20/2023  The patient is 5 week(s) post manipulation and returns for routine follow-up  Patient reports no pain at time of visit, and reports only mild soreness following stretching and strengthening exercises  Patient states that he self discharged from formal physical therapy a little more than a week ago because his co-pays are proving to be, financial burden  He is not currently taking any medication for pain  He states he has been working diligently with a outlined HEP focused on regaining range of motion  He denies any recent onset bruising, swelling, numbness, tingling, or feelings of instability  He also denies any recent fever, chills, headache, nausea, dizziness, or malaise  He states he is able to climb and descend stairs reciprocally now and arise from a seated position without difficulty and without use of upper extremities        Objective     /72   Pulse 74   Temp 97 6 °F (36 4 °C) (Temporal)   Ht 5' 6\" (1 676 m)   Wt 79 8 kg (176 lb)   BMI 28 41 kg/m²     Right knee -   Weight-bearing status: Full WBAT with no use of assistive device  Incision(s): Well-healed  Skin is warm and dry with no signs of erythema, ecchymosis, or infection  Generalized soft tissue swelling, no effusion  ROM: 5° - " 115°  Strength: 5 -/5 seated knee flexion and extension  Knee is stable to varus and valgus stress  Knee is stable to anterior and posterior stress  He is able to rise from a seated without use of his upper extremities  Calf compartments are soft  - Romain's sign  2+ TP and DP pulses with brisk capillary refill to the toes  Sural, saphenous, tibial, superficial and deep peroneal motor and sensory distributions intact  Sensation light touch intact distally      Data Review     No new imaging reviewed this visit    Scribe Attestation    I,:  Pako Bonner am acting as a scribe while in the presence of the attending physician :       I,:  Oksana Damon personally performed the services described in this documentation    as scribed in my presence :

## 2023-09-15 ENCOUNTER — OFFICE VISIT (OUTPATIENT)
Dept: OBGYN CLINIC | Facility: CLINIC | Age: 58
End: 2023-09-15
Payer: COMMERCIAL

## 2023-09-15 VITALS
HEIGHT: 66 IN | BODY MASS INDEX: 29.32 KG/M2 | HEART RATE: 73 BPM | DIASTOLIC BLOOD PRESSURE: 80 MMHG | TEMPERATURE: 97.3 F | SYSTOLIC BLOOD PRESSURE: 150 MMHG | WEIGHT: 182.4 LBS

## 2023-09-15 DIAGNOSIS — Z96.651 PRESENCE OF ARTIFICIAL KNEE JOINT, RIGHT: Primary | ICD-10-CM

## 2023-09-15 DIAGNOSIS — M24.661 ARTHROFIBROSIS OF KNEE JOINT, RIGHT: ICD-10-CM

## 2023-09-15 PROCEDURE — 99213 OFFICE O/P EST LOW 20 MIN: CPT | Performed by: ORTHOPAEDIC SURGERY

## 2023-09-15 NOTE — PROGRESS NOTES
ASSESSMENT/PLAN:    Diagnoses and all orders for this visit:    Presence of artificial knee joint, right    Arthrofibrosis of knee joint, right        Plan: I would recommend follow-up as already scheduled in January for reevaluation of his right knee and AP/lateral and sunrise images of his right knee. In the interim, he may continue working and I have reassured him that I did not see any cause for concern. I did offer to obtain x-rays today but he did not feel the need to obtain x-rays as his primary concern was whether he should continue working due to the symptoms he is experiencing. Return for AS Already scheduled. _____________________________________________________  CHIEF COMPLAINT:  Chief Complaint   Patient presents with   • Follow-up         SUBJECTIVE:  Nasir Dockery is a 59y.o. year old male who presents for follow up of his right knee. He states he has returned to work and he is noting some soreness in the knee, tightness in the knee and a discomfort. He denies pain, stating that it is primarily discomfort. He notes occasionally a burning type of pain along the anterior knee.       PAST MEDICAL HISTORY:  Past Medical History:   Diagnosis Date   • Anemia    • Arthritis     Pt reports in knees and left hip   • Cancer ([address])     lymphoma in 2018- had chemotherapy--4/14/23 NO FURTHER LYMPHPHOMA   • Colon polyp    • COVID-19 2021   • Diabetes mellitus ([address])     4/14/23 per pt Dr Anderson Nations states with Type 2 DM -not on any medications at this time per pt   • Fatty liver    • Hypertension    • Kidney stone     4/14/23 Passed stones independently - no surgery needed per pt   • Osteoarthritis    • Prostate cancer ([address])        PAST SURGICAL HISTORY:  Past Surgical History:   Procedure Laterality Date   • COLONOSCOPY     • FL GUIDED NEEDLE PLAC BX/ASP/INJ  5/9/2016   • HIP SURGERY Left     pt states he had 3 surgeries on left hip   • IR PORT REMOVAL     • JOINT REPLACEMENT     • MD ARTHRP LIZETTE CONDYLE&PLATU MEDIAL&LAT COMPARTMENTS Right 01/31/2023    Procedure: ARTHROPLASTY KNEE TOTAL;  Surgeon: Jeffrey Phillips; Location: [facility];  Service: Orthopedics   • MA MANIPULATION KNEE JOINT UNDER GENERAL ANESTHESIA Right 4/20/2023    Procedure: MANIPULATION JOINT KNEE;  Surgeon: Jeffrey Phillips; Location: [facility];  Service: Orthopedics   • PROSTATECTOMY     • REMOVAL VENOUS PORT (PORT-A-CATH)         FAMILY HISTORY:  Family History   Problem Relation Age of Onset   • No Known Problems Mother    • No Known Problems Father    • Anesthesia problems Neg Hx        SOCIAL HISTORY:  Social History     Tobacco Use   • Smoking status: Former     Packs/day: 1.00     Years: 10.00     Total pack years: 10.00     Types: Cigarettes   • Smokeless tobacco: Never   • Tobacco comments:     Pt quit in the 1990's   Vaping Use   • Vaping Use: Never used   Substance Use Topics   • Alcohol use: Not Currently     Comment: No alcohol use - former use, no problems with former use   • Drug use: Never     Comment: Denies any drug use       MEDICATIONS:    Current Outpatient Medications:   •  lisinopril (ZESTRIL) 5 mg tablet, Take 5 mg by mouth daily, Disp: , Rfl:   •  acetaminophen (TYLENOL) 500 mg tablet, Take 2 tablets (1,000 mg total) by mouth every 8 (eight) hours, Disp: 100 tablet, Rfl: 0  •  amLODIPine (NORVASC) 5 mg tablet, Take 5 mg by mouth daily Takes in the am, Disp: , Rfl:   •  chlorthalidone 25 mg tablet, Take 25 mg by mouth daily 4/14/23 Per pt do take this medication however ran out and has not taken for approx 2 weeks.  Instructed pt to call DR Georgina Holstein: , Rfl:   •  ferrous sulfate 324 (65 Fe) mg, Take 1 tablet (324 mg total) by mouth 2 (two) times a day before meals, Disp: 120 tablet, Rfl: 0  •  folic acid (FOLVITE) 1 mg tablet, Take 1 tablet (1 mg total) by mouth daily, Disp: 60 tablet, Rfl: 0  •  metoprolol tartrate (LOPRESSOR) 25 mg tablet, Take 12.5 mg by mouth every 12 (twelve) hours, Disp: , Rfl: ALLERGIES:  No Known Allergies    REVIEW OF SYSTEMS:  Pertinent items are noted in HPI. A comprehensive review of systems was negative.      _____________________________________________________  PHYSICAL EXAMINATION:  General: well developed and well nourished, alert and appears comfortable  Psychiatric: Normal  HEENT:  Normocephalic, atraumatic  Cardiovascular:  Regular  Pulmonary: No wheezing or stridor  Skin: No masses, erthema, lacerations, fluctation, ulcerations  Neurovascular: Motor and sensory exams are intact and pulses palpable. MUSCULOSKELETAL EXAMINATION:    Right knee exam demonstrates a few degrees flexion contracture and flexion to about 100 degrees. He denies pain during range of motion. Ligaments are stable with varus and valgus stress. There is no significant laxity with anterior drawer. There is no effusion noted. The incision is well-healed. There is no warmth or redness. There is no ascending lymphangitis or adenopathy. He ambulates without assistive devices with a slight limp.             Paige Urban

## 2024-01-25 ENCOUNTER — OFFICE VISIT (OUTPATIENT)
Dept: OBGYN CLINIC | Facility: CLINIC | Age: 59
End: 2024-01-25
Payer: COMMERCIAL

## 2024-01-25 ENCOUNTER — HOSPITAL ENCOUNTER (OUTPATIENT)
Dept: RADIOLOGY | Facility: CLINIC | Age: 59
End: 2024-01-25
Payer: COMMERCIAL

## 2024-01-25 VITALS
HEART RATE: 71 BPM | DIASTOLIC BLOOD PRESSURE: 70 MMHG | TEMPERATURE: 98.2 F | SYSTOLIC BLOOD PRESSURE: 120 MMHG | HEIGHT: 66 IN | WEIGHT: 177 LBS | BODY MASS INDEX: 28.45 KG/M2

## 2024-01-25 DIAGNOSIS — Z96.651 PRESENCE OF ARTIFICIAL KNEE JOINT, RIGHT: ICD-10-CM

## 2024-01-25 DIAGNOSIS — Z96.651 PRESENCE OF ARTIFICIAL KNEE JOINT, RIGHT: Primary | ICD-10-CM

## 2024-01-25 PROCEDURE — 99213 OFFICE O/P EST LOW 20 MIN: CPT | Performed by: ORTHOPAEDIC SURGERY

## 2024-01-25 PROCEDURE — 73562 X-RAY EXAM OF KNEE 3: CPT

## 2024-01-25 NOTE — PROGRESS NOTES
ASSESSMENT/PLAN:    Diagnoses and all orders for this visit:    Presence of artificial knee joint, right  -     XR knee 3 vw right non injury; Future      Plan: I would recommend follow-up in 1 to 2 years.  I do recommend long-term consistent follow-up after joint replacement and have discussed with him how to remember follow-up appointments.  He is to contact me if any questions or concerns arise.    _____________________________________________________  CHIEF COMPLAINT:  Chief Complaint   Patient presents with    Right Knee - Post-op         SUBJECTIVE:  Tim Wright is a 58 y.o. year old male who presents for follow up 9 months s/p manipulation of right TKA performed 4/20/2023, 1 year s/p right TKA performed 1/31/2023. He was last seen regards to this issue on 9/15/2023 at which time he was experiencing unexplained soreness. On today's presentation he denies any complaints. He states that he has been able to return to ADLs as desired without limitations or restrictions. He denies any pain, or recent onset of bruising, swelling, numbness, tingling, feelings of instability, or mechanical symptoms.    PAST MEDICAL HISTORY:  Past Medical History:   Diagnosis Date    Anemia     Arthritis     Pt reports in knees and left hip    Cancer ([facility])     lymphoma in 2018- had chemotherapy--4/14/23 NO FURTHER LYMPHPHOMA    Colon polyp     COVID-19 2021    Diabetes mellitus (HCC)     4/14/23 per pt Dr Caruso states with Type 2 DM -not on any medications at this time per pt    Fatty liver     Hypertension     Kidney stone     4/14/23 Passed stones independently - no surgery needed per pt    Osteoarthritis     Prostate cancer (HCC)        PAST SURGICAL HISTORY:  Past Surgical History:   Procedure Laterality Date    COLONOSCOPY      FL GUIDED NEEDLE PLAC BX/ASP/INJ  5/9/2016    HIP SURGERY Left     pt states he had 3 surgeries on left hip    IR PORT REMOVAL      JOINT REPLACEMENT      FL ARTHRP KNE CONDYLE&PLATU MEDIAL&LAT  COMPARTMENTS Right 01/31/2023    Procedure: ARTHROPLASTY KNEE TOTAL;  Surgeon: Gerson Sotelo;  Location: [facility];  Service: Orthopedics    OH MANIPULATION KNEE JOINT UNDER GENERAL ANESTHESIA Right 4/20/2023    Procedure: MANIPULATION JOINT KNEE;  Surgeon: Gerson Sotelo;  Location: [facility];  Service: Orthopedics    PROSTATECTOMY      REMOVAL VENOUS PORT (PORT-A-CATH)         FAMILY HISTORY:  Family History   Problem Relation Age of Onset    No Known Problems Mother     No Known Problems Father     Anesthesia problems Neg Hx        SOCIAL HISTORY:  Social History     Tobacco Use    Smoking status: Former     Current packs/day: 1.00     Average packs/day: 1 pack/day for 10.0 years (10.0 ttl pk-yrs)     Types: Cigarettes    Smokeless tobacco: Never    Tobacco comments:     Pt quit in the 1990's   Vaping Use    Vaping status: Never Used   Substance Use Topics    Alcohol use: Not Currently     Comment: No alcohol use - former use, no problems with former use    Drug use: Never     Comment: Denies any drug use       MEDICATIONS:    Current Outpatient Medications:     lisinopril (ZESTRIL) 5 mg tablet, Take 10 mg by mouth daily, Disp: , Rfl:     acetaminophen (TYLENOL) 500 mg tablet, Take 2 tablets (1,000 mg total) by mouth every 8 (eight) hours, Disp: 100 tablet, Rfl: 0    amLODIPine (NORVASC) 5 mg tablet, Take 5 mg by mouth daily Takes in the am, Disp: , Rfl:     chlorthalidone 25 mg tablet, Take 25 mg by mouth daily 4/14/23 Per pt do take this medication however ran out and has not taken for approx 2 weeks. Instructed pt to call DR Caruso, Disp: , Rfl:     ferrous sulfate 324 (65 Fe) mg, Take 1 tablet (324 mg total) by mouth 2 (two) times a day before meals, Disp: 120 tablet, Rfl: 0    folic acid (FOLVITE) 1 mg tablet, Take 1 tablet (1 mg total) by mouth daily, Disp: 60 tablet, Rfl: 0    metoprolol tartrate (LOPRESSOR) 25 mg tablet, Take 12.5 mg by mouth every 12 (twelve) hours, Disp: , Rfl:     ALLERGIES:  No  Known Allergies    REVIEW OF SYSTEMS:  Pertinent items are noted in HPI.  A comprehensive review of systems was negative.      _____________________________________________________  PHYSICAL EXAMINATION:  General: well developed and well nourished, alert, oriented times 3, and appears comfortable  Psychiatric: Normal  HEENT: Benign  Cardiovascular: Rate  Pulmonary: No wheezing or stridor  Skin: No masses, erythema, lacerations, fluctation, ulcerations  Neurovascular: Palpable distal pulses, DTRs intact    MUSCULOSKELETAL EXAMINATION:  Right knee -   Patient ambulates with normal gait pattern  Uses no assistive device  No anatomical deformity  Skin is warm and dry to touch with no signs of erythema, ecchymosis, infection  No soft tissue swelling, no effusion noted  Surgical incision is well-healed  ROM 0-120  No tenderness to palpation on exam  Flexor and extensor mechanisms intact  Knee is stable to varus and valgus stress  - Lachman's  - Anterior Drawer, - Posterior Drawer  - medial Estephania's, - lateral Estephania's  Patella tracks centrally without palpable crepitus  Calf compartments are soft and supple  2+ TP and DP pulses with brisk capillary refill to the toes  Sural, saphenous, tibial, superficial and deep peroneal motor and sensory distributions intact  Sensation light touch intact distally    _____________________________________________________  STUDIES REVIEWED:  Attending Physician has personally reviewed pertinent imaging in PACS, impression is as follows:    Review of radiographic series taken 1/25/2024 of the right knee shows well-seated total knee prosthesis with maintained alignment no signs of loosening.      PROCEDURES PERFORMED:  No procedures performed this visit        Scribe Attestation      I,:  Selvin Mayers am acting as a scribe while in the presence of the attending physician.:       I,:  Gerson Sotelo personally performed the services described in this documentation    as scribed in my  presence.:

## 2024-04-22 PROCEDURE — 99284 EMERGENCY DEPT VISIT MOD MDM: CPT

## 2024-04-23 ENCOUNTER — APPOINTMENT (EMERGENCY)
Dept: CT IMAGING | Facility: HOSPITAL | Age: 59
End: 2024-04-23
Payer: COMMERCIAL

## 2024-04-23 ENCOUNTER — HOSPITAL ENCOUNTER (EMERGENCY)
Facility: HOSPITAL | Age: 59
Discharge: OTHER INSTITUTION | End: 2024-04-23
Attending: EMERGENCY MEDICINE
Payer: COMMERCIAL

## 2024-04-23 VITALS
WEIGHT: 172 LBS | HEIGHT: 66 IN | SYSTOLIC BLOOD PRESSURE: 124 MMHG | RESPIRATION RATE: 17 BRPM | BODY MASS INDEX: 27.64 KG/M2 | HEART RATE: 93 BPM | OXYGEN SATURATION: 96 % | DIASTOLIC BLOOD PRESSURE: 65 MMHG | TEMPERATURE: 98.7 F

## 2024-04-23 DIAGNOSIS — C85.90 LYMPHOMA (HCC): ICD-10-CM

## 2024-04-23 DIAGNOSIS — N17.9 AKI (ACUTE KIDNEY INJURY) (HCC): Primary | ICD-10-CM

## 2024-04-23 LAB
ALBUMIN SERPL BCP-MCNC: 3.9 G/DL (ref 3.5–5)
ALP SERPL-CCNC: 98 U/L (ref 34–104)
ALT SERPL W P-5'-P-CCNC: 43 U/L (ref 7–52)
ANION GAP SERPL CALCULATED.3IONS-SCNC: 12 MMOL/L (ref 4–13)
AST SERPL W P-5'-P-CCNC: 57 U/L (ref 13–39)
BACTERIA UR QL AUTO: ABNORMAL /HPF
BASOPHILS # BLD AUTO: 0.03 THOUSANDS/ÂΜL (ref 0–0.1)
BASOPHILS NFR BLD AUTO: 0 % (ref 0–1)
BILIRUB SERPL-MCNC: 2.45 MG/DL (ref 0.2–1)
BILIRUB UR QL STRIP: ABNORMAL
BUN SERPL-MCNC: 26 MG/DL (ref 5–25)
CALCIUM SERPL-MCNC: 8.7 MG/DL (ref 8.4–10.2)
CHLORIDE SERPL-SCNC: 92 MMOL/L (ref 96–108)
CLARITY UR: CLEAR
CO2 SERPL-SCNC: 23 MMOL/L (ref 21–32)
COLOR UR: ABNORMAL
CREAT SERPL-MCNC: 1.7 MG/DL (ref 0.6–1.3)
EOSINOPHIL # BLD AUTO: 0 THOUSAND/ÂΜL (ref 0–0.61)
EOSINOPHIL NFR BLD AUTO: 0 % (ref 0–6)
ERYTHROCYTE [DISTWIDTH] IN BLOOD BY AUTOMATED COUNT: 12.6 % (ref 11.6–15.1)
FLUAV RNA RESP QL NAA+PROBE: NEGATIVE
FLUBV RNA RESP QL NAA+PROBE: NEGATIVE
GFR SERPL CREATININE-BSD FRML MDRD: 42 ML/MIN/1.73SQ M
GLUCOSE SERPL-MCNC: 121 MG/DL (ref 65–140)
GLUCOSE UR STRIP-MCNC: NEGATIVE MG/DL
HCT VFR BLD AUTO: 40.3 % (ref 36.5–49.3)
HGB BLD-MCNC: 13.6 G/DL (ref 12–17)
HGB UR QL STRIP.AUTO: ABNORMAL
HYALINE CASTS #/AREA URNS LPF: ABNORMAL /LPF
IMM GRANULOCYTES # BLD AUTO: 0.1 THOUSAND/UL (ref 0–0.2)
IMM GRANULOCYTES NFR BLD AUTO: 1 % (ref 0–2)
KETONES UR STRIP-MCNC: NEGATIVE MG/DL
LACTATE SERPL-SCNC: 1.1 MMOL/L (ref 0.5–2)
LEUKOCYTE ESTERASE UR QL STRIP: NEGATIVE
LYMPHOCYTES # BLD AUTO: 0.73 THOUSANDS/ÂΜL (ref 0.6–4.47)
LYMPHOCYTES NFR BLD AUTO: 5 % (ref 14–44)
MCH RBC QN AUTO: 29.8 PG (ref 26.8–34.3)
MCHC RBC AUTO-ENTMCNC: 33.7 G/DL (ref 31.4–37.4)
MCV RBC AUTO: 88 FL (ref 82–98)
MONOCYTES # BLD AUTO: 1.28 THOUSAND/ÂΜL (ref 0.17–1.22)
MONOCYTES NFR BLD AUTO: 8 % (ref 4–12)
MUCOUS THREADS UR QL AUTO: ABNORMAL
NEUTROPHILS # BLD AUTO: 14.15 THOUSANDS/ÂΜL (ref 1.85–7.62)
NEUTS SEG NFR BLD AUTO: 86 % (ref 43–75)
NITRITE UR QL STRIP: NEGATIVE
NON-SQ EPI CELLS URNS QL MICRO: ABNORMAL /HPF
NRBC BLD AUTO-RTO: 0 /100 WBCS
PH UR STRIP.AUTO: 6 [PH]
PLATELET # BLD AUTO: 160 THOUSANDS/UL (ref 149–390)
PMV BLD AUTO: 11.5 FL (ref 8.9–12.7)
POTASSIUM SERPL-SCNC: 3.4 MMOL/L (ref 3.5–5.3)
PROCALCITONIN SERPL-MCNC: 2.93 NG/ML
PROT SERPL-MCNC: 6.6 G/DL (ref 6.4–8.4)
PROT UR STRIP-MCNC: NEGATIVE MG/DL
RBC # BLD AUTO: 4.57 MILLION/UL (ref 3.88–5.62)
RBC #/AREA URNS AUTO: ABNORMAL /HPF
RSV RNA RESP QL NAA+PROBE: NEGATIVE
SARS-COV-2 RNA RESP QL NAA+PROBE: NEGATIVE
SODIUM SERPL-SCNC: 127 MMOL/L (ref 135–147)
SP GR UR STRIP.AUTO: 1.01 (ref 1–1.03)
UROBILINOGEN UR QL STRIP.AUTO: 1 E.U./DL
WBC # BLD AUTO: 16.29 THOUSAND/UL (ref 4.31–10.16)
WBC #/AREA URNS AUTO: ABNORMAL /HPF

## 2024-04-23 PROCEDURE — 0241U HB NFCT DS VIR RESP RNA 4 TRGT: CPT | Performed by: EMERGENCY MEDICINE

## 2024-04-23 PROCEDURE — 83605 ASSAY OF LACTIC ACID: CPT | Performed by: EMERGENCY MEDICINE

## 2024-04-23 PROCEDURE — 87181 SC STD AGAR DILUTION PER AGT: CPT | Performed by: EMERGENCY MEDICINE

## 2024-04-23 PROCEDURE — 87076 CULTURE ANAEROBE IDENT EACH: CPT | Performed by: EMERGENCY MEDICINE

## 2024-04-23 PROCEDURE — 87185 SC STD ENZYME DETCJ PER NZM: CPT | Performed by: EMERGENCY MEDICINE

## 2024-04-23 PROCEDURE — 81001 URINALYSIS AUTO W/SCOPE: CPT | Performed by: EMERGENCY MEDICINE

## 2024-04-23 PROCEDURE — 96365 THER/PROPH/DIAG IV INF INIT: CPT

## 2024-04-23 PROCEDURE — 87040 BLOOD CULTURE FOR BACTERIA: CPT | Performed by: EMERGENCY MEDICINE

## 2024-04-23 PROCEDURE — 36415 COLL VENOUS BLD VENIPUNCTURE: CPT | Performed by: EMERGENCY MEDICINE

## 2024-04-23 PROCEDURE — 99285 EMERGENCY DEPT VISIT HI MDM: CPT | Performed by: EMERGENCY MEDICINE

## 2024-04-23 PROCEDURE — 74176 CT ABD & PELVIS W/O CONTRAST: CPT

## 2024-04-23 PROCEDURE — 85025 COMPLETE CBC W/AUTO DIFF WBC: CPT | Performed by: EMERGENCY MEDICINE

## 2024-04-23 PROCEDURE — 96361 HYDRATE IV INFUSION ADD-ON: CPT

## 2024-04-23 PROCEDURE — 87154 CUL TYP ID BLD PTHGN 6+ TRGT: CPT | Performed by: EMERGENCY MEDICINE

## 2024-04-23 PROCEDURE — 80053 COMPREHEN METABOLIC PANEL: CPT | Performed by: EMERGENCY MEDICINE

## 2024-04-23 PROCEDURE — 84145 PROCALCITONIN (PCT): CPT | Performed by: EMERGENCY MEDICINE

## 2024-04-23 RX ORDER — ACETAMINOPHEN 325 MG/1
975 TABLET ORAL ONCE
Status: COMPLETED | OUTPATIENT
Start: 2024-04-23 | End: 2024-04-23

## 2024-04-23 RX ORDER — SODIUM CHLORIDE 9 MG/ML
1000 INJECTION, SOLUTION INTRAVENOUS CONTINUOUS
Status: DISCONTINUED | OUTPATIENT
Start: 2024-04-23 | End: 2024-04-23

## 2024-04-23 RX ADMIN — SODIUM CHLORIDE 1000 ML: 0.9 INJECTION, SOLUTION INTRAVENOUS at 09:49

## 2024-04-23 RX ADMIN — SODIUM CHLORIDE 1000 ML: 0.9 INJECTION, SOLUTION INTRAVENOUS at 06:15

## 2024-04-23 RX ADMIN — PIPERACILLIN SODIUM AND TAZOBACTAM SODIUM 4.5 G: 4; .5 INJECTION, POWDER, LYOPHILIZED, FOR SOLUTION INTRAVENOUS at 01:04

## 2024-04-23 RX ADMIN — SODIUM CHLORIDE 2340 ML: 0.9 INJECTION, SOLUTION INTRAVENOUS at 00:46

## 2024-04-23 RX ADMIN — ACETAMINOPHEN 325MG 975 MG: 325 TABLET ORAL at 08:16

## 2024-04-23 NOTE — EMTALA/ACUTE CARE TRANSFER
[facility] EMERGENCY DEPARTMENT  [address] [facility] [id number]  Dept: [phone]      EMTALA TRANSFER CONSENT    NAME Tim Wright                                         1965                              MRN [id number]    I have been informed of my rights regarding examination, treatment, and transfer   by Dr. Lucian Means MD    Benefits: Specialized equipment and/or services available at the receiving facility (Include comment)________________________    Risks: Potential for delay in receiving treatment, Potential deterioration of medical condition, Loss of IV, Increased discomfort during transfer, Possible worsening of condition or death during transfer      Consent for Transfer:  I acknowledge that my medical condition has been evaluated and explained to me by the emergency department physician or other qualified medical person and/or my attending physician, who has recommended that I be transferred to the service of  Accepting Physician: Dr Harp at Accepting Facility Name, City & State : [facility]. The above potential benefits of such transfer, the potential risks associated with such transfer, and the probable risks of not being transferred have been explained to me, and I fully understand them.  The doctor has explained that, in my case, the benefits of transfer outweigh the risks.  I agree to be transferred.    I authorize the performance of emergency medical procedures and treatments upon me in both transit and upon arrival at the receiving facility.  Additionally, I authorize the release of any and all medical records to the receiving facility and request they be transported with me, if possible.  I understand that the safest mode of transportation during a medical emergency is an ambulance and that the Hospital advocates the use of this mode of transport. Risks of traveling to the receiving facility by car, including absence of medical control, life  sustaining equipment, such as oxygen, and medical personnel has been explained to me and I fully understand them.    (MAGDY CORRECT BOX BELOW)  [X]  I consent to the stated transfer and to be transported by ambulance/helicopter.  [  ]  I consent to the stated transfer, but refuse transportation by ambulance and accept full responsibility for my transportation by car.  I understand the risks of non-ambulance transfers and I exonerate the Hospital and its staff from any deterioration in my condition that results from this refusal.    X___________________________________________    DATE  24  TIME________  Signature of patient or legally responsible individual signing on patient behalf           RELATIONSHIP TO PATIENT_________________________          Provider Certification    NAME Tim Wright                                         1965                              MRN [id number]    A medical screening exam was performed on the above named patient.  Based on the examination:    Condition Necessitating Transfer There were no encounter diagnoses.    Patient Condition: The patient has been stabilized such that within reasonable medical probability, no material deterioration of the patient condition or the condition of the unborn child(lazaro) is likely to result from the transfer    Reason for Transfer: Level of Care needed not available at this facility, Patient/Family request    Transfer Requirements: Facility [facility]   Space available and qualified personnel available for treatment as acknowledged by    Agreed to accept transfer and to provide appropriate medical treatment as acknowledged by       Dr Harp  Appropriate medical records of the examination and treatment of the patient are provided at the time of transfer   STAFF INITIAL WHEN COMPLETED _______  Transfer will be performed by qualified personnel from    and appropriate transfer equipment as required, including the use of necessary and  appropriate life support measures.    Provider Certification: I have examined the patient and explained the following risks and benefits of being transferred/refusing transfer to the patient/family:  General risk, such as traffic hazards, adverse weather conditions, rough terrain or turbulence, possible failure of equipment (including vehicle or aircraft), or consequences of actions of persons outside the control of the transport personnel, Unanticipated needs of medical equipment and personnel during transport, Risk of worsening condition, The possibility of a transport vehicle being unavailable      Based on these reasonable risks and benefits to the patient and/or the unborn child(lazaro), and based upon the information available at the time of the patient’s examination, I certify that the medical benefits reasonably to be expected from the provision of appropriate medical treatments at another medical facility outweigh the increasing risks, if any, to the individual’s medical condition, and in the case of labor to the unborn child, from effecting the transfer.    X____________________________________________ DATE 04/23/24        TIME_______      ORIGINAL - SEND TO MEDICAL RECORDS   COPY - SEND WITH PATIENT DURING TRANSFER

## 2024-04-23 NOTE — ED NOTES
Report given via phone to Prerna WINSTON at [facility].      Anabell Coffey RN  04/23/24 [id number]

## 2024-04-23 NOTE — ED PROVIDER NOTES
History  Chief Complaint   Patient presents with    Flank Pain     Pt reports three nights ago he started with bilateral flank pain and lower abdominal pain. Also reports N/V and fever 102 last two nights, has been taking Tylenol. Fever broke this morning. PMHx of kidney stones.      Patient with prior history of kidney stones complains of 2 days of fevers, blood in urine, flank pain, dysuria.  Patient also relates prior history of lymphoma, currently in remission.      History provided by:  Patient   used: No    Flank Pain  Pain location:  R flank and L flank  Pain quality: not aching    Pain radiates to:  Does not radiate  Pain severity:  Moderate  Onset quality:  Gradual  Duration:  2 days  Timing:  Constant  Progression:  Unchanged  Chronicity:  New  Relieved by:  Nothing  Worsened by:  Nothing  Ineffective treatments:  None tried  Associated symptoms: dysuria and fever    Associated symptoms: no chest pain, no chills, no constipation, no cough, no diarrhea, no hematemesis, no hematochezia, no hematuria, no nausea, no shortness of breath, no sore throat and no vomiting        Prior to Admission Medications   Prescriptions Last Dose Informant Patient Reported? Taking?   acetaminophen (TYLENOL) 500 mg tablet   No No   Sig: Take 2 tablets (1,000 mg total) by mouth every 8 (eight) hours   amLODIPine (NORVASC) 5 mg tablet   Yes No   Sig: Take 5 mg by mouth daily Takes in the am   chlorthalidone 25 mg tablet   Yes No   Sig: Take 25 mg by mouth daily 4/14/23 Per pt do take this medication however ran out and has not taken for approx 2 weeks. Instructed pt to call DR Caruso   ferrous sulfate 324 (65 Fe) mg   No No   Sig: Take 1 tablet (324 mg total) by mouth 2 (two) times a day before meals   folic acid (FOLVITE) 1 mg tablet   No No   Sig: Take 1 tablet (1 mg total) by mouth daily   lisinopril (ZESTRIL) 5 mg tablet   Yes No   Sig: Take 10 mg by mouth daily   metoprolol tartrate (LOPRESSOR) 25 mg  tablet   Yes No   Sig: Take 12.5 mg by mouth every 12 (twelve) hours      Facility-Administered Medications: None       Past Medical History:   Diagnosis Date    Anemia     Arthritis     Pt reports in knees and left hip    Cancer (HCC)     lymphoma in 2018- had chemotherapy--4/14/23 NO FURTHER LYMPHPHOMA    Colon polyp     COVID-19 2021    Diabetes mellitus (HCC)     4/14/23 per pt Dr Caruso states with Type 2 DM -not on any medications at this time per pt    Fatty liver     Hypertension     Kidney stone     4/14/23 Passed stones independently - no surgery needed per pt    Osteoarthritis     Prostate cancer (HCC)        Past Surgical History:   Procedure Laterality Date    COLONOSCOPY      FL GUIDED NEEDLE PLAC BX/ASP/INJ  05/09/2016    HIP SURGERY Left     pt states he had 3 surgeries on left hip    IR PORT REMOVAL      JOINT REPLACEMENT      ID ARTHRP KNE CONDYLE&PLATU MEDIAL&LAT COMPARTMENTS Right 01/31/2023    Procedure: ARTHROPLASTY KNEE TOTAL;  Surgeon: Gerson Sotelo;  Location: [facility];  Service: Orthopedics    ID MANIPULATION KNEE JOINT UNDER GENERAL ANESTHESIA Right 04/20/2023    Procedure: MANIPULATION JOINT KNEE;  Surgeon: Gerson Sotelo;  Location: [facility];  Service: Orthopedics    PROSTATECTOMY      REMOVAL VENOUS PORT (PORT-A-CATH)      REPLACEMENT TOTAL KNEE Right     TOTAL HIP ARTHROPLASTY Left        Family History   Problem Relation Age of Onset    No Known Problems Mother     No Known Problems Father     Anesthesia problems Neg Hx      I have reviewed and agree with the history as documented.    E-Cigarette/Vaping    E-Cigarette Use Never User     Comments Denies any use      E-Cigarette/Vaping Substances     Social History     Tobacco Use    Smoking status: Former     Current packs/day: 1.00     Average packs/day: 1 pack/day for 10.0 years (10.0 ttl pk-yrs)     Types: Cigarettes    Smokeless tobacco: Never    Tobacco comments:     Pt quit in the 1990's   Vaping Use    Vaping status:  Never Used   Substance Use Topics    Alcohol use: Not Currently     Comment: No alcohol use - former use, no problems with former use    Drug use: Never     Comment: Denies any drug use       Review of Systems   Constitutional:  Positive for fever. Negative for chills.   HENT:  Negative for ear pain, hearing loss, sore throat, trouble swallowing and voice change.    Eyes:  Negative for pain and discharge.   Respiratory:  Negative for cough, shortness of breath and wheezing.    Cardiovascular:  Negative for chest pain and palpitations.   Gastrointestinal:  Negative for abdominal pain, blood in stool, constipation, diarrhea, hematemesis, hematochezia, nausea and vomiting.   Genitourinary:  Positive for dysuria and flank pain. Negative for frequency and hematuria.   Musculoskeletal:  Negative for joint swelling, neck pain and neck stiffness.   Skin:  Negative for rash and wound.   Neurological:  Negative for dizziness, seizures, syncope, facial asymmetry and headaches.   Psychiatric/Behavioral:  Negative for hallucinations, self-injury and suicidal ideas.    All other systems reviewed and are negative.      Physical Exam  Physical Exam  Vitals and nursing note reviewed.   Constitutional:       General: He is not in acute distress.     Appearance: He is well-developed.   HENT:      Head: Normocephalic and atraumatic.      Right Ear: External ear normal.      Left Ear: External ear normal.   Eyes:      General: No scleral icterus.        Right eye: No discharge.         Left eye: No discharge.      Extraocular Movements: Extraocular movements intact.      Conjunctiva/sclera: Conjunctivae normal.   Cardiovascular:      Rate and Rhythm: Normal rate and regular rhythm.      Heart sounds: Normal heart sounds. No murmur heard.  Pulmonary:      Effort: Pulmonary effort is normal.      Breath sounds: Normal breath sounds. No wheezing or rales.   Abdominal:      General: Bowel sounds are normal. There is no distension.       Palpations: Abdomen is soft.      Tenderness: There is no abdominal tenderness. There is no guarding or rebound.   Musculoskeletal:         General: No deformity. Normal range of motion.      Cervical back: Normal range of motion and neck supple.   Skin:     General: Skin is warm and dry.      Findings: No rash.   Neurological:      General: No focal deficit present.      Mental Status: He is alert and oriented to person, place, and time.      Cranial Nerves: No cranial nerve deficit.   Psychiatric:         Mood and Affect: Mood normal.         Behavior: Behavior normal.         Thought Content: Thought content normal.         Judgment: Judgment normal.         Vital Signs  ED Triage Vitals [04/23/24 0002]   Temperature Pulse Respirations Blood Pressure SpO2   98.2 °F (36.8 °C) 99 18 105/65 97 %      Temp Source Heart Rate Source Patient Position - Orthostatic VS BP Location FiO2 (%)   Oral Monitor Sitting Left arm --      Pain Score       5           Vitals:    04/23/24 0215 04/23/24 0230 04/23/24 0245 04/23/24 0300   BP: 122/74 113/62 96/60 113/65   Pulse: 102 (!) 113 97 (!) 107   Patient Position - Orthostatic VS:  Lying           Visual Acuity      ED Medications  Medications   sodium chloride 0.9 % bolus 2,340 mL (0 mL Intravenous Stopped 4/23/24 0221)   piperacillin-tazobactam (ZOSYN) 4.5 g in sodium chloride 0.9 % 100 mL IVPB (0 g Intravenous Stopped 4/23/24 0147)       Diagnostic Studies  Results Reviewed       Procedure Component Value Units Date/Time    Urine Microscopic [[id number]]  (Abnormal) Collected: 04/23/24 0142    Lab Status: Final result Specimen: Urine, Clean Catch Updated: 04/23/24 0221     RBC, UA 2-4 /hpf      WBC, UA 2-4 /hpf      Epithelial Cells Occasional /hpf      Bacteria, UA Occasional /hpf      Hyaline Casts, UA 4-10 /lpf      MUCUS THREADS Occasional    UA w Reflex to Microscopic w Reflex to Culture [[id number]]  (Abnormal) Collected: 04/23/24 0142    Lab Status: Final result Specimen:  Urine, Clean Catch Updated: 04/23/24 0207     Color, UA Amelie     Clarity, UA Clear     Specific Gravity, UA 1.010     pH, UA 6.0     Leukocytes, UA Negative     Nitrite, UA Negative     Protein, UA Negative mg/dl      Glucose, UA Negative mg/dl      Ketones, UA Negative mg/dl      Urobilinogen, UA 1.0 E.U./dl      Bilirubin, UA Small     Occult Blood, UA Small    Procalcitonin [[id number]]  (Abnormal) Collected: 04/23/24 0040    Lab Status: Final result Specimen: Blood from Arm, Right Updated: 04/23/24 0121     Procalcitonin 2.93 ng/ml     Lactic acid, plasma (w/reflex if result > 2.0) [[id number]]  (Normal) Collected: 04/23/24 0040    Lab Status: Final result Specimen: Blood from Arm, Right Updated: 04/23/24 0121     LACTIC ACID 1.1 mmol/L     Narrative:      Result may be elevated if tourniquet was used during collection.    Comprehensive metabolic panel [[id number]]  (Abnormal) Collected: 04/23/24 0040    Lab Status: Final result Specimen: Blood from Arm, Right Updated: 04/23/24 0117     Sodium 127 mmol/L      Potassium 3.4 mmol/L      Chloride 92 mmol/L      CO2 23 mmol/L      ANION GAP 12 mmol/L      BUN 26 mg/dL      Creatinine 1.70 mg/dL      Glucose 121 mg/dL      Calcium 8.7 mg/dL      AST 57 U/L      ALT 43 U/L      Alkaline Phosphatase 98 U/L      Total Protein 6.6 g/dL      Albumin 3.9 g/dL      Total Bilirubin 2.45 mg/dL      eGFR 42 ml/min/1.73sq m     Narrative:      National Kidney Disease Foundation guidelines for Chronic Kidney Disease (CKD):     Stage 1 with normal or high GFR (GFR > 90 mL/min/1.73 square meters)    Stage 2 Mild CKD (GFR = 60-89 mL/min/1.73 square meters)    Stage 3A Moderate CKD (GFR = 45-59 mL/min/1.73 square meters)    Stage 3B Moderate CKD (GFR = 30-44 mL/min/1.73 square meters)    Stage 4 Severe CKD (GFR = 15-29 mL/min/1.73 square meters)    Stage 5 End Stage CKD (GFR <15 mL/min/1.73 square meters)  Note: GFR calculation is accurate only with a steady state creatinine    CBC  and differential [[id number]]  (Abnormal) Collected: 04/23/24 0040    Lab Status: Final result Specimen: Blood from Arm, Right Updated: 04/23/24 0057     WBC 16.29 Thousand/uL      RBC 4.57 Million/uL      Hemoglobin 13.6 g/dL      Hematocrit 40.3 %      MCV 88 fL      MCH 29.8 pg      MCHC 33.7 g/dL      RDW 12.6 %      MPV 11.5 fL      Platelets 160 Thousands/uL      nRBC 0 /100 WBCs      Segmented % 86 %      Immature Grans % 1 %      Lymphocytes % 5 %      Monocytes % 8 %      Eosinophils Relative 0 %      Basophils Relative 0 %      Absolute Neutrophils 14.15 Thousands/µL      Absolute Immature Grans 0.10 Thousand/uL      Absolute Lymphocytes 0.73 Thousands/µL      Absolute Monocytes 1.28 Thousand/µL      Eosinophils Absolute 0.00 Thousand/µL      Basophils Absolute 0.03 Thousands/µL     Blood culture #2 [[id number]] Collected: 04/23/24 0040    Lab Status: In process Specimen: Blood from Arm, Right Updated: 04/23/24 0054    Blood culture #1 [[id number]] Collected: 04/23/24 0040    Lab Status: In process Specimen: Blood from Arm, Left Updated: 04/23/24 0054                   CT renal stone study abdomen pelvis wo contrast   Final Result by Gerhard Johnson MD (04/23 0138)      1.  There is a 5.9 x 3.9 x 4.0 cm hypodense mass abutting the right external iliac artery/vein noted with mild surrounding hazy inflammatory stranding. Differential considerations include large hematoma, phlegmon/abscess, necrotic lymph node or necrotic    neoplasm. Consider correlation with contrast-enhanced CT or MRI for better evaluation.   2.  There is a 1.2 x 2.0 cm irregular soft tissue density focus along the left ventral mid abdominal mesentery adjacent to small bowel loops noted with minimal hazy surrounding inflammatory stranding. This is of uncertain etiology with differential    considerations including inflamed lymph node, small focus of sclerosing mesenteritis, small mesenteric granuloma, hematoma, or desmoid. Comparison with any  prior available outside studies will be helpful to ensure stability. Recommend clinical    correlation to exclude possibility of small carcinoid tumor.   3.  Nonspecific prominent right inguinal lymph nodes noted with the largest measuring up to 2.0 x 1.5 cm.   4.  No evidence for nephrolithiasis or hydronephrosis bilaterally. No findings of bowel obstruction, inflammation, appendicitis, free air, or free fluid noted.   5.  Mild wall thickening of the urinary bladder could be due to under distention, recommend correlation with urinalysis to exclude cystitis.   6.  Evaluation of pelvic structures limited by metallic streak artifact emanating from left hip hardware.         Workstation performed: [id number]                    Procedures  Procedures         ED Course  ED Course as of 04/23/24 0347   Tue Apr 23, 2024   0215 Medicine contacted for admission   0238 Dr Bello has reviewed case and recommends that patient be transferred to tertiary care center for treatment as he will require multiple subspecialty care not available at [facility] at this time.   0333 Discussed with the [facility] hospitalist, states that patient did not receive any oncology care previously at [facility] as patient has not stated.  Rechecked with the patient and he states that he received his cancer care at [facility].  Based on this, will attempt transfer to [facility] which is where patient would prefer to go.   0337 Accepted at [facility] by hospitalist Dr Harp                               SBIRT 20yo+      Flowsheet Row Most Recent Value   Initial Alcohol Screen: US AUDIT-C     1. How often do you have a drink containing alcohol? 0 Filed at: 04/23/2024 0004   2. How many drinks containing alcohol do you have on a typical day you are drinking?  0 Filed at: 04/23/2024 0004   3a. Male UNDER 65: How often do you have five or more drinks on one occasion? 0 Filed at: 04/23/2024 0004   Audit-C Score 0 Filed at: 04/23/2024 0004    ALLISON: How many times in the past year have you...    Used an illegal drug or used a prescription medication for non-medical reasons? Never Filed at: 04/23/2024 0004                      Medical Decision Making  Patient presents to the emergency department with flank pain, reported fevers.      Based on the MSE and the history provided, diagnostic considerations include but are not limited to muscular pain, pyelonephritis, infected kidney stone    Based on the work-up performed in the emergency department which includes physical examination, laboratory testing, imaging which may include advanced imaging as necessary such as CT scan or ultrasound, it is deemed that the patient will require admission to the hospital for treatment of new intra-abdominal masses, concern for recurrence of lymphoma, sepsis criteria    Patient presented with sepsis criteria and was being worked up for treatment of possible infected stone.  Imaging revealed new abdominal masses but no kidney stones.  Due to concern for possible abscess, necrotic lymph node, other infectious source, it was felt that admission would be appropriate.  Patient was presented to hospitalist here who felt that he would require tertiary care center for multiple subspecialty intervention for heme-onc evaluation, possible IR intervention etc.  Patient was asked his preference and elected for [facility] and they were contacted and willing to accept the patient..      Amount and/or Complexity of Data Reviewed  Labs: ordered. Decision-making details documented in ED Course.     Details: BERNADETTE noted  Radiology: ordered and independent interpretation performed. Decision-making details documented in ED Course.     Details: New masses concerning for recurrence of lymphoma noted on CT abdomen pelvis             Disposition  Final diagnoses:   BERNADETTE (acute kidney injury) (HCC)   Lymphoma (HCC)     Time reflects when diagnosis was documented in both MDM as applicable and  the Disposition within this note       Time User Action Codes Description Comment    4/23/2024  3:47 AM Lucian Means Add [N17.9] BERNADETTE (acute kidney injury) (HCC)     4/23/2024  3:47 AM Lucian Means Add [C85.90] Lymphoma (HCC)           ED Disposition       ED Disposition   Transfer to Another Facility-In Network    Condition   --    Date/Time   Tue Apr 23, 2024 0340    Comment                  MD Documentation      Flowsheet Row Most Recent Value   Patient Condition The patient has been stabilized such that within reasonable medical probability, no material deterioration of the patient condition or the condition of the unborn child(lazaro) is likely to result from the transfer   Reason for Transfer Level of Care needed not available at this facility, Patient/Family request   Benefits of Transfer Specialized equipment and/or services available at the receiving facility (Include comment)________________________   Risks of Transfer Potential for delay in receiving treatment, Potential deterioration of medical condition, Loss of IV, Increased discomfort during transfer, Possible worsening of condition or death during transfer   Accepting Physician Dr Harp   Accepting Facility Name, [facility]   Sending MD Dr Means   Provider Certification General risk, such as traffic hazards, adverse weather conditions, rough terrain or turbulence, possible failure of equipment (including vehicle or aircraft), or consequences of actions of persons outside the control of the transport personnel, Unanticipated needs of medical equipment and personnel during transport, Risk of worsening condition, The possibility of a transport vehicle being unavailable          RN Documentation      Flowsheet Row Most Recent Value   Accepting Facility Name, [facility]          Follow-up Information    None         Patient's Medications   Discharge Prescriptions    No medications on file       No discharge  procedures on file.    PDMP Review         Value Time User    PDMP Reviewed  Yes 4/20/2023  7:28 AM Charly Chapman PA-C            ED Provider  Electronically Signed by             Lucian Means MD  04/23/24 3798

## 2024-04-23 NOTE — EMTALA/ACUTE CARE TRANSFER
[facility] EMERGENCY DEPARTMENT  [address] [facility] [address]  Dept: [phone]      EMTALA TRANSFER CONSENT    NAME Tim Wright                                         1965                              MRN [id number]    I have been informed of my rights regarding examination, treatment, and transfer   by Dr. Lucian Means MD    Benefits: Specialized equipment and/or services available at the receiving facility (Include comment)________________________    Risks: Potential for delay in receiving treatment, Potential deterioration of medical condition, Loss of IV, Increased discomfort during transfer, Possible worsening of condition or death during transfer      Consent for Transfer:  I acknowledge that my medical condition has been evaluated and explained to me by the emergency department physician or other qualified medical person and/or my attending physician, who has recommended that I be transferred to the service of  Accepting Physician: Dr Harp at Accepting Facility Name, City & State : [facility]. The above potential benefits of such transfer, the potential risks associated with such transfer, and the probable risks of not being transferred have been explained to me, and I fully understand them.  The doctor has explained that, in my case, the benefits of transfer outweigh the risks.  I agree to be transferred.    I authorize the performance of emergency medical procedures and treatments upon me in both transit and upon arrival at the receiving facility.  Additionally, I authorize the release of any and all medical records to the receiving facility and request they be transported with me, if possible.  I understand that the safest mode of transportation during a medical emergency is an ambulance and that the Hospital advocates the use of this mode of transport. Risks of traveling to the receiving facility by car, including absence of medical control, life  sustaining equipment, such as oxygen, and medical personnel has been explained to me and I fully understand them.    (MAGDY CORRECT BOX BELOW)  [X]  I consent to the stated transfer and to be transported by ambulance/helicopter.  [  ]  I consent to the stated transfer, but refuse transportation by ambulance and accept full responsibility for my transportation by car.  I understand the risks of non-ambulance transfers and I exonerate the Hospital and its staff from any deterioration in my condition that results from this refusal.    X___________________________________________    DATE  24  TIME________  Signature of patient or legally responsible individual signing on patient behalf           RELATIONSHIP TO PATIENT_________________________          Provider Certification    NAME Tim Wright                                         1965                              MRN [id number]    A medical screening exam was performed on the above named patient.  Based on the examination:    Condition Necessitating Transfer There were no encounter diagnoses.    Patient Condition: The patient has been stabilized such that within reasonable medical probability, no material deterioration of the patient condition or the condition of the unborn child(lazaro) is likely to result from the transfer    Reason for Transfer: Level of Care needed not available at this facility, Patient/Family request    Transfer Requirements: Facility [facility]   Space available and qualified personnel available for treatment as acknowledged by    Agreed to accept transfer and to provide appropriate medical treatment as acknowledged by       Dr Harp  Appropriate medical records of the examination and treatment of the patient are provided at the time of transfer   STAFF INITIAL WHEN COMPLETED _______  Transfer will be performed by qualified personnel from    and appropriate transfer equipment as required, including the use of necessary and  appropriate life support measures.    Provider Certification: I have examined the patient and explained the following risks and benefits of being transferred/refusing transfer to the patient/family:  General risk, such as traffic hazards, adverse weather conditions, rough terrain or turbulence, possible failure of equipment (including vehicle or aircraft), or consequences of actions of persons outside the control of the transport personnel, Unanticipated needs of medical equipment and personnel during transport, Risk of worsening condition, The possibility of a transport vehicle being unavailable      Based on these reasonable risks and benefits to the patient and/or the unborn child(lazaro), and based upon the information available at the time of the patient’s examination, I certify that the medical benefits reasonably to be expected from the provision of appropriate medical treatments at another medical facility outweigh the increasing risks, if any, to the individual’s medical condition, and in the case of labor to the unborn child, from effecting the transfer.    X____________________________________________ DATE 04/23/24        TIME_______      ORIGINAL - SEND TO MEDICAL RECORDS   COPY - SEND WITH PATIENT DURING TRANSFER

## 2024-04-23 NOTE — QUICK NOTE
Patient referred for admission to hospitalist service at [facility] by ED physician.    Comprehensive case review completed.    In brief, 58-year-old male with past medical history significant for diffuse B-cell lymphoma, status post treatment, nephrolithiasis, hypertension, who presented this evening with complaints of flank and abdominal pain, fever, fatigue, x 3 days.    In ED, patient met criteria for severe sepsis with tachycardia, tachypnea, leukocytosis.  Elevated procalcitonin.  Patient without lactic acidosis or fever in ED (Tylenol prior to arrival).  Evidence of endorgan damage with acute kidney injury upon presentation.    The patient was initiated on fluid resuscitation and empiric antibiotics.  Index of suspicion for intra-abdominal source given findings of CT abdomen pelvis as detailed below:    IMPRESSION:     1.  There is a 5.9 x 3.9 x 4.0 cm hypodense mass abutting the right external iliac artery/vein noted with mild surrounding hazy inflammatory stranding. Differential considerations include large hematoma, phlegmon/abscess, necrotic lymph node or necrotic   neoplasm. Consider correlation with contrast-enhanced CT or MRI for better evaluation.  2.  There is a 1.2 x 2.0 cm irregular soft tissue density focus along the left ventral mid abdominal mesentery adjacent to small bowel loops noted with minimal hazy surrounding inflammatory stranding. This is of uncertain etiology with differential   considerations including inflamed lymph node, small focus of sclerosing mesenteritis, small mesenteric granuloma, hematoma, or desmoid. Comparison with any prior available outside studies will be helpful to ensure stability. Recommend clinical   correlation to exclude possibility of small carcinoid tumor.  3.  Nonspecific prominent right inguinal lymph nodes noted with the largest measuring up to 2.0 x 1.5 cm.  4.  No evidence for nephrolithiasis or hydronephrosis bilaterally. No findings of bowel  obstruction, inflammation, appendicitis, free air, or free fluid noted.  5.  Mild wall thickening of the urinary bladder could be due to under distention, recommend correlation with urinalysis to exclude cystitis.  6.  Evaluation of pelvic structures limited by metallic streak artifact emanating from left hip hardware.    Plan:  1.severe sepsis without septic shock  -Continue empiric antimicrobials with fluid resuscitation  -Recommend transfer to tertiary care facility given possible source of sepsis which is likely intra-abdominal abscess versus multifocal necrotic lymph nodes.    -The patient will require timely and definitive source control and evaluation by multiple subspecialties unavailable at this institution.  -Recommend transfer to tertiary care facility for expediated ability to address multiple abnormalities.   -Intra-abdominal mass lesions/phlegmon/abscess/necrosis- will likely require evaluation by surgical oncology/medical oncology (Unavailable at [facility]), IR (unavalable at [facility] until 4/25).   Vascular involvement/ abutment of iliac artery and vein-patient will require timely evaluation by in house vascular surgery.

## 2024-04-23 NOTE — ED CARE HANDOFF
Emergency Department Sign Out Note        Sign out and transfer of care from Dr Means. See Separate Emergency Department note.     The patient, Tim Wright, was evaluated by the previous provider for flank pain, acute kidney injury and lymphoma.    Workup Completed:  History physical, labs and diagnostic studies    ED Course / Workup Pending (followup):  Pending transfer                                  ED Course as of 04/23/24 0627   Tue Apr 23, 2024   0626 Patient blood pressure currently 87/52.  Ordered saline.  Patient otherwise comfortable awaiting transfer     Procedures  Medical Decision Making  Amount and/or Complexity of Data Reviewed  Labs: ordered.  Radiology: ordered.            Disposition  Final diagnoses:   BERNADETTE (acute kidney injury) (HCC)   Lymphoma (HCC)     Time reflects when diagnosis was documented in both MDM as applicable and the Disposition within this note       Time User Action Codes Description Comment    4/23/2024  3:47 AM Lucian Means [N17.9] BERNADETTE (acute kidney injury) (HCC)     4/23/2024  3:47 AM Lucian Means [C85.90] Lymphoma (HCC)           ED Disposition       ED Disposition   Transfer to Another Facility-In Network    Condition   --    Date/Time   Tue Apr 23, 2024  3:40 AM    Comment                  MD Documentation      Flowsheet Row Most Recent Value   Patient Condition The patient has been stabilized such that within reasonable medical probability, no material deterioration of the patient condition or the condition of the unborn child(lazaro) is likely to result from the transfer   Reason for Transfer Level of Care needed not available at this facility, Patient/Family request   Benefits of Transfer Specialized equipment and/or services available at the receiving facility (Include comment)________________________   Risks of Transfer Potential for delay in receiving treatment, Potential deterioration of medical condition, Loss of IV, Increased discomfort during  transfer, Possible worsening of condition or death during transfer   Accepting Physician Dr Harp   Accepting Facility Name, [facility]   Sending MD Dr Means   Provider Certification General risk, such as traffic hazards, adverse weather conditions, rough terrain or turbulence, possible failure of equipment (including vehicle or aircraft), or consequences of actions of persons outside the control of the transport personnel, Unanticipated needs of medical equipment and personnel during transport, Risk of worsening condition, The possibility of a transport vehicle being unavailable          RN Documentation      Flowsheet Row Most Recent Value   Accepting Facility Name, [facility]          Follow-up Information    None       Patient's Medications   Discharge Prescriptions    No medications on file     No discharge procedures on file.       ED Provider  Electronically Signed by     Caitlin Salazar DO  04/23/24 9012

## 2024-04-23 NOTE — ED NOTES
Multiple episodes of hypotension, Dr. Salazar aware, NSS infusing. Patient denies complaints      Anabell Coffey RN  04/23/24 0911

## 2024-04-28 LAB
ALL TARGETS: NOT DETECTED
BACTERIA BLD CULT: ABNORMAL
BACTERIA BLD CULT: ABNORMAL
GRAM STN SPEC: ABNORMAL
GRAM STN SPEC: ABNORMAL

## 2024-07-28 PROBLEM — Z80.42 FAMILY HISTORY OF PROSTATE CANCER: Status: ACTIVE | Noted: 2024-07-28

## 2024-07-28 PROBLEM — N52.31 ERECTILE DYSFUNCTION AFTER RADICAL PROSTATECTOMY: Status: ACTIVE | Noted: 2024-07-28

## 2024-07-28 PROBLEM — N39.3 MALE STRESS INCONTINENCE: Status: ACTIVE | Noted: 2024-07-28

## 2024-07-28 PROBLEM — Z87.442 HISTORY OF RENAL CALCULI: Status: ACTIVE | Noted: 2024-07-28

## 2024-07-28 PROBLEM — K65.1 PELVIC ABSCESS IN MALE (HCC): Status: ACTIVE | Noted: 2024-07-28

## 2024-07-28 NOTE — PROGRESS NOTES
UROLOGY PROGRESS NOTE         NAME: Tim Wright  AGE: 58 y.o. SEX: male  : 1965   MRN: [id number]    DATE: 2024  TIME: 1:03 AM    Assessment and Plan   Procedures     Impression:   1. Family history of prostate cancer  2. History of prostate cancer  3. Erectile dysfunction after radical prostatectomy  4. Male stress incontinence  5. History of renal calculi  6. Pelvic abscess in male (HCC)       Plan: Check PSA today.  Long as remains undetectable would recommend physical exam and PSA in 6 months.  He should be followed twice a year for his high risk prostate cancer since he is only a year and a half out from his surgery.  He understands and agrees.      Chief Complaint   No chief complaint on file.    History of Present Illness     HPI: Tim Wright is a 58 y.o. year old male who presents with history of prostate cancer, wishing to have his care transferred from [facility] urology to [facility]  From [facility] telemedicine video on 2023 patient with history of intermediate risk prostate cancer status post radical prostatectomy and bilateral pelvic lymph node dissection in August to .  Patient had a Stockton score of 3+4 equal 7.  He was reported at that time his PSA was undetectable but still had ongoing incontinence where he dribbles here and there and was wearing 2 pads a day.  Patient was not having ED issues.  Was not interested in pursuing any other treatment for his incontinence at that time.  In the past the patient was taking tadalafil as needed for erectile dysfunction.  That was noted and a refill on 3/8/2023.    Patient also with a history of kidney stones as well as lymphoma in remission since .  Patient is also a diabetic.  The plan at that time was to follow-up in 6 months.  Of note on 2021 patient had a PSA of 46, Stockton 7, family history of prostate cancer, PI-RADS score 2 MRI  with a 24 g prostate.    Patient has seen Dr. Carver in the past  with previous biopsies.  He was taking Flomax also in the past.  And has passed kidney stones.  His PSA on 4/23/2024 was less than 0.02.  Patient had a CAT scan 7/8/2024 that shows decrease in size of a right pelvic sidewall enhancing abscess previously 4.4 cm down to 3.4 cm..  Also has low-density nodules in the mesentery previously 2.8 cm now 3.1.  Please note his bone scan preop and 2021 was negative for mets and CT scan 2021 showed a prominent mesenteric node and wali masses that have been stable from prior exams.    Will discuss questionable need for a PMS a PET scan.  On 4/25/2024 patient has CT-guided percutaneous right groin lymph node biopsy with history of lymphoma and pathology report came back as negative.    Currently, patient wears 1 pad a day for incontinence and nonbothersome.  Also has erectile dysfunction nonbothersome at this time.  Otherwise no constitutional symptoms.      The following portions of the patient's history were reviewed and updated as appropriate: allergies, current medications, past family history, past medical history, past social history, past surgical history and problem list.  Past Medical History:   Diagnosis Date    Anemia     Arthritis     Pt reports in knees and left hip    Cancer (HCC)     lymphoma in 2018- had chemotherapy--4/14/23 NO FURTHER LYMPHPHOMA    Colon polyp     COVID-19     2021    Diabetes mellitus (HCC)     4/14/23 per pt Dr Caruso states with Type 2 DM -not on any medications at this time per pt    Fatty liver     Hypertension     Kidney stone     4/14/23 Passed stones independently - no surgery needed per pt    Osteoarthritis     Prostate cancer (HCC)      Past Surgical History:   Procedure Laterality Date    COLONOSCOPY      FL GUIDED NEEDLE PLAC BX/ASP/INJ  05/09/2016    HIP SURGERY Left     pt states he had 3 surgeries on left hip    IR PORT REMOVAL      JOINT REPLACEMENT      IA ARTHRP KNE CONDYLE&PLATU MEDIAL&LAT COMPARTMENTS Right 01/31/2023     Procedure: ARTHROPLASTY KNEE TOTAL;  Surgeon: Gerson Sotelo;  Location:  MAIN OR;  Service: Orthopedics    LA MANIPULATION KNEE JOINT UNDER GENERAL ANESTHESIA Right 04/20/2023    Procedure: MANIPULATION JOINT KNEE;  Surgeon: Gerson Sotelo;  Location: [facility];  Service: Orthopedics    PROSTATECTOMY      REMOVAL VENOUS PORT (PORT-A-CATH)      REPLACEMENT TOTAL KNEE Right     TOTAL HIP ARTHROPLASTY Left      shoulder  Review of Systems     Const: Denies chills, fever and weight loss.  CV: Denies chest pain.  Resp: Denies SOB.  GI: Denies abdominal pain, nausea and vomiting.  : Denies symptoms other than stated above.  Musculo: Denies back pain.    Objective   There were no vitals taken for this visit.    Physical Exam  Const: Appears healthy and well developed. No signs of acute distress present.  Resp: Respirations are regular and unlabored.   CV: Rate is regular. Rhythm is regular.  Abdomen: Abdomen is soft, nontender, and nondistended. Kidneys are not palpable.  : Empty prostatic fossa bladder nontender nondistended.  Psych: Patient's attitude is cooperative. Mood is normal. Affect is normal.    Current Medications     Current Outpatient Medications:     acetaminophen (TYLENOL) 500 mg tablet, Take 2 tablets (1,000 mg total) by mouth every 8 (eight) hours, Disp: 100 tablet, Rfl: 0    amLODIPine (NORVASC) 5 mg tablet, Take 5 mg by mouth daily Takes in the am, Disp: , Rfl:     chlorthalidone 25 mg tablet, Take 25 mg by mouth daily 4/14/23 Per pt do take this medication however ran out and has not taken for approx 2 weeks. Instructed pt to call DR Caruso, Disp: , Rfl:     ferrous sulfate 324 (65 Fe) mg, Take 1 tablet (324 mg total) by mouth 2 (two) times a day before meals, Disp: 120 tablet, Rfl: 0    folic acid (FOLVITE) 1 mg tablet, Take 1 tablet (1 mg total) by mouth daily, Disp: 60 tablet, Rfl: 0    lisinopril (ZESTRIL) 5 mg tablet, Take 10 mg by mouth daily, Disp: , Rfl:     metoprolol tartrate  (LOPRESSOR) 25 mg tablet, Take 12.5 mg by mouth every 12 (twelve) hours, Disp: , Rfl:         Zaki Cast MD

## 2024-07-30 ENCOUNTER — TELEPHONE (OUTPATIENT)
Dept: UROLOGY | Facility: CLINIC | Age: 59
End: 2024-07-30

## 2024-07-30 DIAGNOSIS — R35.1 NOCTURIA: Primary | ICD-10-CM

## 2024-07-30 NOTE — TELEPHONE ENCOUNTER
Pt called back and was advised of the same. Pt unsure of the name of the lab facility he utilizes, will call back with the information so the lab order can be faxed.

## 2024-07-30 NOTE — TELEPHONE ENCOUNTER
Left a voicemail to inform patient that we cannot place orders in chart until care is established.

## 2024-07-30 NOTE — TELEPHONE ENCOUNTER
Patient called back with information on the lab where he will go have PSA drawn.    [facility] [id number]    Please place PSA order and please fax to:  [phone]

## 2024-07-30 NOTE — TELEPHONE ENCOUNTER
Left message for pt to call office back to complete psa testing for upcoming appt. Order in. Please find out where pt would like to have testing done and fax if going other then [facility].

## 2024-07-31 RX ORDER — BACLOFEN 10 MG/1
10 TABLET ORAL
COMMUNITY
Start: 2024-05-06

## 2024-07-31 RX ORDER — CYCLOSPORINE 0.5 MG/ML
1 EMULSION OPHTHALMIC 2 TIMES DAILY
COMMUNITY
Start: 2024-04-17

## 2024-08-06 ENCOUNTER — OFFICE VISIT (OUTPATIENT)
Dept: UROLOGY | Facility: CLINIC | Age: 59
End: 2024-08-06
Payer: COMMERCIAL

## 2024-08-06 VITALS
HEIGHT: 66 IN | BODY MASS INDEX: 30.15 KG/M2 | HEART RATE: 96 BPM | SYSTOLIC BLOOD PRESSURE: 160 MMHG | DIASTOLIC BLOOD PRESSURE: 82 MMHG | TEMPERATURE: 97.2 F | OXYGEN SATURATION: 96 % | WEIGHT: 187.6 LBS

## 2024-08-06 DIAGNOSIS — Z80.42 FAMILY HISTORY OF PROSTATE CANCER: Primary | ICD-10-CM

## 2024-08-06 DIAGNOSIS — N39.3 MALE STRESS INCONTINENCE: ICD-10-CM

## 2024-08-06 DIAGNOSIS — Z85.46 HISTORY OF PROSTATE CANCER: ICD-10-CM

## 2024-08-06 DIAGNOSIS — K65.1 PELVIC ABSCESS IN MALE (HCC): ICD-10-CM

## 2024-08-06 DIAGNOSIS — Z85.46 PERSONAL HISTORY OF MALIGNANT NEOPLASM OF PROSTATE: ICD-10-CM

## 2024-08-06 DIAGNOSIS — N52.31 ERECTILE DYSFUNCTION AFTER RADICAL PROSTATECTOMY: ICD-10-CM

## 2024-08-06 DIAGNOSIS — Z87.442 HISTORY OF RENAL CALCULI: ICD-10-CM

## 2024-08-06 LAB
SL AMB  POCT GLUCOSE, UA: NORMAL
SL AMB LEUKOCYTE ESTERASE,UA: NORMAL
SL AMB POCT BILIRUBIN,UA: NORMAL
SL AMB POCT BLOOD,UA: NORMAL
SL AMB POCT CLARITY,UA: CLEAR
SL AMB POCT COLOR,UA: YELLOW
SL AMB POCT KETONES,UA: NORMAL
SL AMB POCT NITRITE,UA: NORMAL
SL AMB POCT PH,UA: 5.5
SL AMB POCT SPECIFIC GRAVITY,UA: 1.02
SL AMB POCT URINE PROTEIN: NORMAL
SL AMB POCT UROBILINOGEN: 1

## 2024-08-06 PROCEDURE — 81003 URINALYSIS AUTO W/O SCOPE: CPT | Performed by: UROLOGY

## 2024-08-06 PROCEDURE — 99204 OFFICE O/P NEW MOD 45 MIN: CPT | Performed by: UROLOGY

## 2024-08-06 RX ORDER — DOXYCYCLINE HYCLATE 100 MG
100 TABLET ORAL 2 TIMES DAILY
COMMUNITY
Start: 2024-06-06

## 2024-08-09 ENCOUNTER — TELEPHONE (OUTPATIENT)
Dept: UROLOGY | Facility: CLINIC | Age: 59
End: 2024-08-09

## 2024-08-09 NOTE — TELEPHONE ENCOUNTER
----- Message from Zaki Cast MD sent at 8/7/2024  8:43 AM EDT -----  Please let patient know PSA is excellent and keep follow-up with me in 6 months thanks  ----- Message -----  From: Khurram Salmon  Sent: 8/7/2024   8:19 AM EDT  To: Zaki Cast MD

## 2025-03-19 NOTE — PROGRESS NOTES
UROLOGY PROGRESS NOTE         NAME: Tim Wright  AGE: 59 y.o. SEX: male  : 1965   MRN: [id number]    DATE: 3/19/2025  TIME: 5:05 PM    Assessment and Plan   Procedures     Impression:   1. History of prostate cancer  2. Male stress incontinence  3. Family history of prostate cancer  4. Erectile dysfunction after radical prostatectomy  5. History of renal calculi  6. Pelvic abscess in male (HCC)       Plan: Will set patient up for referral to discuss artificial urinary sphincter with one of my colleagues in [facility] urology.  Patient is interested in considering this is a surgical option.    We did discuss as outlined below in my note other options for stress urine incontinence after radical prostatectomy including the Cunningham/penile clamp, male sling, and nonsurgical management with physical therapy and Kegels.    Will check PSA in 6 months.  His follow-up will be in 1 year or sooner if he has some urological concerns.      Chief Complaint   No chief complaint on file.    History of Present Illness     HPI: Tim Wright is a 59 y.o. year old male who presents with follow-up from office visit on 2024.  Patient with a history of prostate cancer diagnosed at [facility] urology.  Per their note, was found to have intermediate risk prostate cancer and had a radical prostatectomy and bilateral pelvic lymph node dissection 2021 Maikel score was 7.  He stated his PSA was undetectable but he had ongoing incontinence and was wearing 2 pads a day.  He was not having ED issues.  He was not interested in treatment for his incontinence at the last office visit.  However he was taken sildenafil for ED.    Patient also with a history of kidney stones as well as lymphoma in remission since .  His pretreatment PSA was 46 and family history of prostate cancer and PI-RADS score 2 MRI in  had a 24 g prostate.  Several biopsies done by [facility] in the past.  Had been on Flomax in  the past.    PSA 4/23/2024 was again 7/8/2024 showed decreased size of the right pelvic sidewall abscess.  CT in 2021 showed present mesenteric nodes and wali mass that have been stable for years from prior exams.    Patient was fine with as long as the PSA remains stable to do exams twice a year.  His PSA on 8/6/2024 was 0.02 and current PSA pending.  Current PSA 3/20/2025 less than 0.0 2    She had follow-up CT scan 9/6/2024 that shows substantial reduction of the right pelvic sidewall abscess.  And no significant change in the mesenteric nodes.  Currently patient was pleased with PSA results.  Patient complaining of increased stress urinary incontinence is now bothersome.  When he is at work and has to bend over he leaks.  He occasionally has urge urge incontinence but distress is most bothersome.  He has tried Kegel exercise without much success and does not have a lot of interest in physical therapy.    The patient inquired about artificial urinary sphincter and we did discuss that today.  I also discussed with him the male sling and also Cunningham/penile clamp.    Patient is interested in talking to one of the [facility] urologist down in [place] who specialize in artificial urinary sphincter so I will make arrangements for that.    No longer using sildenafil at this time not active sexually.  No other constitutional symptoms in place it was pleased that his pelvic abscess did show some regression and he remains asymptomatic.  Patient states he was never told the reason how the abscess occurred.        The following portions of the patient's history were reviewed and updated as appropriate: allergies, current medications, past family history, past medical history, past social history, past surgical history and problem list.  Past Medical History:   Diagnosis Date    Anemia     Arthritis     Pt reports in knees and left hip    Cancer (HCC)     lymphoma in 2018- had chemotherapy--4/14/23 NO FURTHER  LYMPHPHOMA    Colon polyp     COVID-19     2021    Diabetes mellitus (HCC)     4/14/23 per pt Dr Caruso states with Type 2 DM -not on any medications at this time per pt    Fatty liver     Hypertension     Kidney stone     4/14/23 Passed stones independently - no surgery needed per pt    Osteoarthritis     Prostate cancer (HCC)      Past Surgical History:   Procedure Laterality Date    COLONOSCOPY      FL GUIDED NEEDLE PLAC BX/ASP/INJ  05/09/2016    HIP SURGERY Left     pt states he had 3 surgeries on left hip    IR PORT REMOVAL      JOINT REPLACEMENT      [place] ARTHRP KNE CONDYLE&PLATU MEDIAL&LAT COMPARTMENTS Right 01/31/2023    Procedure: ARTHROPLASTY KNEE TOTAL;  Surgeon: Gerson Sotelo;  Location: [facility];  Service: Orthopedics    [place] MANIPULATION KNEE JOINT UNDER GENERAL ANESTHESIA Right 04/20/2023    Procedure: MANIPULATION JOINT KNEE;  Surgeon: Gerson Sotelo;  Location: [facility];  Service: Orthopedics    PROSTATECTOMY      REMOVAL VENOUS PORT (PORT-A-CATH)      REPLACEMENT TOTAL KNEE Right     TOTAL HIP ARTHROPLASTY Left      shoulder  Review of Systems     Const: Denies chills, fever and weight loss.  CV: Denies chest pain.  Resp: Denies SOB.  GI: Denies abdominal pain, nausea and vomiting.  : Denies symptoms other than stated above.  Musculo: Denies back pain.    Objective   There were no vitals taken for this visit.    Physical Exam  Const: Appears healthy and well developed. No signs of acute distress present.  Resp: Respirations are regular and unlabored.   CV: Rate is regular. Rhythm is regular.  Abdomen: Abdomen is soft, nontender, and nondistended. Kidneys are not palpable.  : External detail exam no adenopathy was palpable.  Psych: Patient's attitude is cooperative. Mood is normal. Affect is normal.    Current Medications     Current Outpatient Medications:     baclofen 10 mg tablet, Take 10 mg by mouth, Disp: , Rfl:     Cholecalciferol 1.25 MG (74899 UT) capsule, Take 1 capsule by mouth once a  week, Disp: , Rfl:     cycloSPORINE (Restasis) 0.05 % ophthalmic emulsion, Administer 1 drop to both eyes 2 (two) times a day, Disp: , Rfl:     doxycycline hyclate (VIBRA-TABS) 100 mg tablet, Take 100 mg by mouth 2 (two) times a day, Disp: , Rfl:     lisinopril (ZESTRIL) 5 mg tablet, Take 10 mg by mouth daily, Disp: , Rfl:         Zaki Cast MD

## 2025-03-20 ENCOUNTER — TELEPHONE (OUTPATIENT)
Dept: UROLOGY | Facility: CLINIC | Age: 60
End: 2025-03-20

## 2025-03-21 ENCOUNTER — TELEPHONE (OUTPATIENT)
Dept: UROLOGY | Facility: CLINIC | Age: 60
End: 2025-03-21

## 2025-03-21 NOTE — TELEPHONE ENCOUNTER
Physical Therapy Daily Treatment     Visit Count: 16 (visit #5 in 2018)  Plan of Care Dates: Initial: 12/4/2017 Through: 2/26/2018     Insurance Information:   THERAPY BENEFITS:  PAYOR: [employer]  VISIT LIMIT: 50 VISITS PER CALENDAR YEAR COMBINED, PT, OT,   AUTHORIZATION NEEDED: NO  Next Referring Provider Visit: At 1 year post-op or earlier if having issues     Referred by: Jorge Alberto Alarcon MD  Medical Diagnosis (from order): M17.11 Osteoarthritis of right knee  (primary encounter diagnosis)  M17.12 Osteoarthritis of left knee   Treatment Diagnosis: Knee Symptoms with Pain, Impaired Joint Mobility, Impaired Range of Motion, Impaired Motor Function/Muscle Performance, Impaired Gait/Locomotion Deficits, Impaired Mobility, Impaired Balance and Movement Coordination Impairments  Insurance: 1. [employer]  2. N/A     Date of Surgery: 11/28/2017; Surgery performed: Bilateral TKA; Physician Guidelines: yes     Diagnosis Precautions: None  Chart reviewed: Relevant co-morbidities, allergies, tests and medications:   Meds:  Percocet one 5 mg tablet every 4 hours; supplementing with Advil; Baby ASA for blood thinner  No other pertinent medical co-morbidities other than Hypertension which is well-controlled with medications    SUBJECTIVE   Current Pain:  0-1/10. Primary complaint of stiffness at knees and tightness in calf and hamstring muscles.  Worst pain after full day of activity is 3-4/10 which resolves with rest and use of ice or heat.    Functional Change:   Compliant with HEP 2-3 x/day. Has been biking up to 10 minutes with low tension.   No longer using cane even outdoors.  Wearing hiking with good  out in snow.  Can drive for about 1 hour before he feels stiff  Did 2 hours of near-continuous standing activity 1/13/18     OBJECTIVE   Gait Analysis:  Ambulated into clinic with no cane held with cautious but stable gait pattern.  Still having difficulty with small steps and stairs.     Range of  Called lab for PSA results.    Will fax over   Motion (degrees)     Norm Left Right Left Right   Date   Initial  Pre/post Initial  Pre/post 1/15/2018  Pre/post 1/15/2018  Pre/post   Knee Flexion 135  108/115* 106/113* 133/135 128/131   Knee Extension 0-5 -5/-3* -3/-1* 0/0 0/0   standard testing positions unless otherwise noted; Key: ranges are reported in active range of motion unless noted as AA=active assistive or P=passive range of motion, * denotes pain   Comments: Moderate pain, minimal guarding at end-ranges of motion, right > left knee     Knee Strength: * denotes pain   Left Right   Flexion 5/5 5/5   Extension 4+/5 4/5   Comments: 0 degree quad lag bilaterally       Knee Circumference: (cm)  1/15/18    Left Right   10 cm Proximal  46.8 47.9   5 cm Proximal 45.5 47.5   Joint Line         42 43.5   5 cm Distal 39.6 41.4   10 cm Distal 41.2 42   20 cm Distal 37 37.5   Comments:  Slight increase above right knee    Outcome Measures: (Outcome Scoring) 1/08/18  Lower Extremity Functional Scale: 41 (0=extreme difficulty; 80=no difficulty) , improved from previous score 9.     Treatment   Therapeutic exercise:  NuStep at seat 11 and resistance of 4 for 5 minutes total, legs only  Upright bike seat #10, level 2 resistance x 5 minutes  Multi-Hip:  Platform #2  · Hip abduction and flexion P3 x 10 reps R/L  · Hip extension P5 x 10 reps R/L  · Forward step ups x 10 reps R/L  Standing terminal knee extension with Green theraband 10x bilaterally   Mini squats at counter 10x changed to sit down squats by bed due to too much reliance on upper extremities     Neuromuscular re-education:  Tandem stance balance right/left forward 30 second hold  Single leg stance balance added upper extremity movement to increase challenge. 30 second hold x 4    Manual Therapy:   Soft Tissue Mobilization to bilateral scar and quads in supine, hamstring and calf in prone  PROM knee flexion in supine and prone R/L    Current Home Program (not performed this date except as noted above):   Quad  sets  Glute sets  SLR  Heel slides with towel assist  Extension hangs  Ankle pumps  Mini squats  Elevation, icing  Stationary biking x 5 minutes 2 x/day (normal seat height when biking with tension)  Standing hamstring stretch with leg on hassock 20 seconds x 3 reps R/L  Standing TKE and seated hamstring curl with green band 1-3 sets x 10 reps R/L    ASSESSMENT   Patient to try decreasing amount of time icing as he states the icing makes his quad cramp up.  Continues to have difficulty engaging quads making stairs and sit to stand transfers difficult.    Pain after treatment: not assessed.    Result of above outlined education: Verbalizes understanding and Demonstrates understanding    Goals:  To be obtained by end of this plan of care:  1. Patient independent with modified and progressed home exercise program.  · Compliant with daily exercises.  Needs progression for strengthening.   2. Patient will decrease involved knee pain/symptoms to 0/10 to aid in community ambulation for activities of independent living, returning to community activities including safely driving a car, and sleeping undisturbed through a night.   · Met at rest. Pain escalates to 6-7/10 during ROM into flexion and after prolonged activity  3. Patient will increase bilateral knee active range of motion to >/= 0-120° to aid in normalization of gait for independent living, completing transfers including low and soft surfaces, completion of self-care tasks/dressing, safely driving a car.   · Goal met  4. Patient will increase involved knee strength to 5/5 to aid in ambulating on level and unlevel surfaces, stair ambulation, age appropriate activities and squatting for lifting for household independent living.  · Ongoing  5. Patient will ambulate community distances on even and uneven terrain with normal gait pattern without assistive device and without loss of balance.  · Ongoing  6. Patient will be able to ascend and descend 1 flight of stairs  using reciprocal pattern without  pain/difficulty.  · Ongoing  7. Patient will be able to tolerate standing activities for greater than or equal to 90 minutes without pain/difficulty  · Currently 60 minutes.  Goal ongoing.   8. Lower Extremity Functional Scale: Patient will complete form to reflect an improved score from initial score of 9 to greater than or equal to 60 (0=extreme difficulty; 80=no difficulty) to indicate pt reported improvement in function/disability/impairment (minimal detectable change: 9 points).     Goal Status:  See bullet points above as reassessed on 1//08/18    PLAN   Continue PT 2x/week for 4 weeks with tapering as goals are met. Treatment interventions to include manual therapy, therapeutic exercises, local modalities as needed, neuromuscular re-education, and gait training.  Add dynamic balance activities such as rebounder.    THERAPY DAILY BILLING   Primary Insurance:  [employer]  Secondary Insurance: N/A    Evaluation Procedures:  No evaluation codes were used on this date of service    Timed Procedures:  Manual Therapy, 30 minutes  Neuromuscular Re-Education, 10 minutes  Therapeutic Exercise, 20 minutes    Untimed Procedures:  No untimed codes were used on this date of service    Total Treatment Time: 60 minutes    Physician Signature on file.

## 2025-03-27 ENCOUNTER — OFFICE VISIT (OUTPATIENT)
Dept: UROLOGY | Facility: CLINIC | Age: 60
End: 2025-03-27
Payer: COMMERCIAL

## 2025-03-27 VITALS
OXYGEN SATURATION: 98 % | HEART RATE: 79 BPM | HEIGHT: 66 IN | SYSTOLIC BLOOD PRESSURE: 148 MMHG | TEMPERATURE: 98.5 F | BODY MASS INDEX: 27.8 KG/M2 | DIASTOLIC BLOOD PRESSURE: 78 MMHG | WEIGHT: 173 LBS

## 2025-03-27 DIAGNOSIS — Z80.42 FAMILY HISTORY OF PROSTATE CANCER: ICD-10-CM

## 2025-03-27 DIAGNOSIS — N52.31 ERECTILE DYSFUNCTION AFTER RADICAL PROSTATECTOMY: ICD-10-CM

## 2025-03-27 DIAGNOSIS — Z87.442 HISTORY OF RENAL CALCULI: ICD-10-CM

## 2025-03-27 DIAGNOSIS — Z85.46 HISTORY OF PROSTATE CANCER: Primary | ICD-10-CM

## 2025-03-27 DIAGNOSIS — N39.3 MALE STRESS INCONTINENCE: ICD-10-CM

## 2025-03-27 DIAGNOSIS — K65.1 PELVIC ABSCESS IN MALE (HCC): ICD-10-CM

## 2025-03-27 PROCEDURE — 99214 OFFICE O/P EST MOD 30 MIN: CPT | Performed by: UROLOGY

## 2025-03-27 RX ORDER — CHLORTHALIDONE 25 MG/1
TABLET ORAL
COMMUNITY

## 2025-03-27 NOTE — PATIENT INSTRUCTIONS
Other options for stress urine incontinence after radical prostatectomy in addition to artificial urinary sphincter include physical therapy and Kegels, a penile clamp also known as a Cunningham clamp, and a male sling procedure

## 2025-08-08 ENCOUNTER — TELEPHONE (OUTPATIENT)
Age: 60
End: 2025-08-08

## (undated) DEVICE — BASIC SINGLE BASIN 2-LF: Brand: MEDLINE INDUSTRIES, INC.

## (undated) DEVICE — U-DRAPE: Brand: CONVERTORS

## (undated) DEVICE — ABDOMINAL PAD: Brand: DERMACEA

## (undated) DEVICE — NEEDLE SPINAL 18G X 3IN DISP

## (undated) DEVICE — 3M™ IOBAN™ 2 ANTIMICROBIAL INCISE DRAPE 6651EZ: Brand: IOBAN™ 2

## (undated) DEVICE — BETHLEHEM UNIV TOTAL KNEE, KIT: Brand: CARDINAL HEALTH

## (undated) DEVICE — NEEDLE 18 G X 1 1/2 SAFETY

## (undated) DEVICE — 3M™ DURAPORE™ SURGICAL TAPE 1538-3, 3 INCH X 10 YARD (7,5CM X 9,1M), 4 ROLLS/BOX: Brand: 3M™ DURAPORE™

## (undated) DEVICE — DRAPE SHEET THREE QUARTER

## (undated) DEVICE — CAPIT KNEE ATTUNE FB CEMENT -DEPUY

## (undated) DEVICE — DRESSING MEPILEX AG BORDER 4 X 8 IN

## (undated) DEVICE — ACE WRAP 6 IN XL STERILE

## (undated) DEVICE — THE SIMPULSE SOLO SYSTEM WITH ULTREX RETRACTABLE SPLASH SHIELD TIP: Brand: SIMPULSE SOLO

## (undated) DEVICE — DRESSING MEPILEX AG BORDER POST-OP 4 X 10 IN

## (undated) DEVICE — NON-STERILE REUSABLE TOURNIQUET CUFF SINGLE BLADDER, DUAL PORT AND QUICK CONNECT CONNECTOR: Brand: COLOR CUFF

## (undated) DEVICE — DRAPE EQUIPMENT RF WAND

## (undated) DEVICE — COMFORT HOOD -75 EA/BX: Brand: CARDINAL HEALTH

## (undated) DEVICE — TELFA NON-ADHERENT ABSORBENT DRESSING: Brand: TELFA

## (undated) DEVICE — CURITY NON-ADHERENT STRIPS: Brand: CURITY

## (undated) DEVICE — SUT VICRYL 2-0 CP-1 27 IN J266H

## (undated) DEVICE — STOCKINETTE,IMPERVIOUS,12X48,STERILE: Brand: MEDLINE

## (undated) DEVICE — BANDAGE, ESMARK LF STR 6"X9' (20/CS): Brand: CYPRESS

## (undated) DEVICE — GLOVE SRG BIOGEL ECLIPSE 7

## (undated) DEVICE — CEMENT MIXING TOWER

## (undated) DEVICE — RECIPROCATING BLADE, DOUBLE SIDED, OFFSET  (70.0 X 0.64 X 12.6MM)

## (undated) DEVICE — PROXIMATE SKIN STAPLERS (35 WIDE) CONTAINS 35 STAINLESS STEEL STAPLES (FIXED HEAD): Brand: PROXIMATE

## (undated) DEVICE — HYDROPHILIC WOUND DRESSING WITH ZINC PLUS VITAMINS A AND B6.: Brand: DERMAGRAN®-B

## (undated) DEVICE — CAST PADDING 6 IN SYNTHETIC STRL

## (undated) DEVICE — COBAN 6 IN STERILE

## (undated) DEVICE — ADHESIVE SKIN HIGH VISCOSITY EXOFIN 1ML

## (undated) DEVICE — DUAL CUT SAGITTAL BLADE

## (undated) DEVICE — SPONGE 4 X 4 XRAY 16 PLY STRL LF RFD

## (undated) DEVICE — MAYO STAND COVER: Brand: CONVERTORS

## (undated) DEVICE — GLOVE SRG BIOGEL ECLIPSE 7.5

## (undated) DEVICE — GLOVE INDICATOR PI UNDERGLOVE SZ 8 BLUE

## (undated) DEVICE — NEPTUNE E-SEP SMOKE EVACUATION PENCIL, COATED, 70MM BLADE, PUSH BUTTON SWITCH: Brand: NEPTUNE E-SEP

## (undated) DEVICE — GLOVE INDICATOR UNDERGLOVE SZ 7.5 GREEN

## (undated) DEVICE — 2.7MM CANNULATED DRILL BIT/QC 160MM

## (undated) DEVICE — SUT VICRYL 0 CT-1 27 IN J260H

## (undated) DEVICE — GLOVE SRG BIOGEL 8

## (undated) DEVICE — SURGICAL CLIPPER BLADE GENERAL USE

## (undated) DEVICE — GLOVE SRG BIOGEL 7.5

## (undated) DEVICE — GLOVE INDICATOR PI UNDERGLOVE SZ 7 BLUE

## (undated) DEVICE — DRAPE STERI 1010 18IN X 12IN

## (undated) DEVICE — SYRINGE 30ML LL